# Patient Record
Sex: FEMALE | Race: WHITE | HISPANIC OR LATINO | Employment: OTHER
[De-identification: names, ages, dates, MRNs, and addresses within clinical notes are randomized per-mention and may not be internally consistent; named-entity substitution may affect disease eponyms.]

---

## 2017-01-02 ENCOUNTER — HOSPITAL (OUTPATIENT)
Dept: OTHER | Age: 67
End: 2017-01-02
Attending: OBSTETRICS & GYNECOLOGY

## 2017-02-20 ENCOUNTER — HOSPITAL (OUTPATIENT)
Dept: OTHER | Age: 67
End: 2017-02-20
Attending: OBSTETRICS & GYNECOLOGY

## 2017-08-01 ENCOUNTER — PRIOR ORIGINAL RECORDS (OUTPATIENT)
Dept: OTHER | Age: 67
End: 2017-08-01

## 2017-08-01 ENCOUNTER — HOSPITAL (OUTPATIENT)
Dept: OTHER | Age: 67
End: 2017-08-01
Attending: OBSTETRICS & GYNECOLOGY

## 2017-08-01 LAB — CANCER AG125 SERPL-ACNC: 20 UNIT/ML (ref 0–35)

## 2017-08-25 ENCOUNTER — PRIOR ORIGINAL RECORDS (OUTPATIENT)
Dept: OTHER | Age: 67
End: 2017-08-25

## 2017-08-25 ENCOUNTER — HOSPITAL (OUTPATIENT)
Dept: OTHER | Age: 67
End: 2017-08-25
Attending: OBSTETRICS & GYNECOLOGY

## 2017-09-01 ENCOUNTER — HOSPITAL (OUTPATIENT)
Dept: OTHER | Age: 67
End: 2017-09-01
Attending: OBSTETRICS & GYNECOLOGY

## 2017-09-18 ENCOUNTER — HOSPITAL (OUTPATIENT)
Dept: OTHER | Age: 67
End: 2017-09-18
Attending: OBSTETRICS & GYNECOLOGY

## 2017-09-19 ENCOUNTER — LAB SERVICES (OUTPATIENT)
Dept: OTHER | Age: 67
End: 2017-09-19

## 2017-09-19 LAB
ANALYZER ANC (IANC): ABNORMAL
BASOPHILS # BLD: 0 THOUSAND/MCL (ref 0–0.3)
BASOPHILS NFR BLD: 0 %
DIFFERENTIAL METHOD BLD: ABNORMAL
EOSINOPHIL # BLD: 0 THOUSAND/MCL (ref 0.1–0.5)
EOSINOPHIL NFR BLD: 0 %
ERYTHROCYTE [DISTWIDTH] IN BLOOD: 13.2 % (ref 11–15)
HEMATOCRIT: 31.7 % (ref 36–46.5)
HGB BLD-MCNC: 10.6 GM/DL (ref 12–15.5)
LYMPHOCYTES # BLD: 1.3 THOUSAND/MCL (ref 1–4)
LYMPHOCYTES NFR BLD: 8 %
MCH RBC QN AUTO: 29.6 PG (ref 26–34)
MCHC RBC AUTO-ENTMCNC: 33.4 GM/DL (ref 32–36.5)
MCV RBC AUTO: 88.5 FL (ref 78–100)
MONOCYTES # BLD: 0.9 THOUSAND/MCL (ref 0.3–0.9)
MONOCYTES NFR BLD: 6 %
NEUTROPHILS # BLD: 13.1 THOUSAND/MCL (ref 1.8–7.7)
NEUTROPHILS NFR BLD: 86 %
NEUTS SEG NFR BLD: ABNORMAL %
PERCENT NRBC: ABNORMAL
PLATELET # BLD: 167 THOUSAND/MCL (ref 140–450)
RBC # BLD: 3.58 MILLION/MCL (ref 4–5.2)
WBC # BLD: 15.2 THOUSAND/MCL (ref 4.2–11)

## 2017-09-20 LAB
ANALYZER ANC (IANC): ABNORMAL
BASOPHILS # BLD: 0 K/MCL (ref 0–0.3)
BASOPHILS NFR BLD: 0 %
DIFFERENTIAL METHOD BLD: ABNORMAL
EOSINOPHIL # BLD: 0 K/MCL (ref 0.1–0.5)
EOSINOPHIL NFR BLD: 0 %
ERYTHROCYTE [DISTWIDTH] IN BLOOD: 13.2 % (ref 11–15)
HEMATOCRIT: 31.7 % (ref 36–46.5)
HEMOGLOBIN: 10.6 G/DL (ref 12–15.5)
LYMPHOCYTES # BLD: 1.3 K/MCL (ref 1–4)
LYMPHOCYTES NFR BLD: 8 %
MEAN CORPUSCULAR HEMOGLOBIN: 29.6 PG (ref 26–34)
MEAN CORPUSCULAR HGB CONC: 33.4 G/DL (ref 32–36.5)
MEAN CORPUSCULAR VOLUME: 88.5 FL (ref 78–100)
MONOCYTES # BLD: 0.9 K/MCL (ref 0.3–0.9)
MONOCYTES NFR BLD: 6 %
NEUTROPHILS # BLD: 13.1 K/MCL (ref 1.8–7.7)
NEUTROPHILS NFR BLD: 86 %
NEUTS SEG NFR BLD: ABNORMAL
NRBC (NRBCRE): ABNORMAL
PLATELET COUNT: 167 K/MCL (ref 140–450)
RED CELL COUNT: 3.58 MIL/MCL (ref 4–5.2)
WHITE BLOOD COUNT: 15.2 K/MCL (ref 4.2–11)

## 2017-09-29 ENCOUNTER — PRIOR ORIGINAL RECORDS (OUTPATIENT)
Dept: OTHER | Age: 67
End: 2017-09-29

## 2017-10-01 ENCOUNTER — HOSPITAL (OUTPATIENT)
Dept: OTHER | Age: 67
End: 2017-10-01
Attending: OBSTETRICS & GYNECOLOGY

## 2017-10-04 ENCOUNTER — PRIOR ORIGINAL RECORDS (OUTPATIENT)
Dept: OTHER | Age: 67
End: 2017-10-04

## 2020-02-09 VITALS
HEART RATE: 80 BPM | RESPIRATION RATE: 18 BRPM | WEIGHT: 236.77 LBS | SYSTOLIC BLOOD PRESSURE: 125 MMHG | DIASTOLIC BLOOD PRESSURE: 74 MMHG

## 2020-02-09 VITALS
WEIGHT: 239.86 LBS | HEART RATE: 66 BPM | DIASTOLIC BLOOD PRESSURE: 81 MMHG | SYSTOLIC BLOOD PRESSURE: 136 MMHG | RESPIRATION RATE: 20 BRPM

## 2020-02-09 VITALS
DIASTOLIC BLOOD PRESSURE: 79 MMHG | RESPIRATION RATE: 12 BRPM | SYSTOLIC BLOOD PRESSURE: 141 MMHG | HEART RATE: 71 BPM | WEIGHT: 236.33 LBS

## 2020-02-09 VITALS
WEIGHT: 237.1 LBS | SYSTOLIC BLOOD PRESSURE: 161 MMHG | RESPIRATION RATE: 16 BRPM | DIASTOLIC BLOOD PRESSURE: 82 MMHG | HEART RATE: 87 BPM

## 2020-02-29 ENCOUNTER — APPOINTMENT (OUTPATIENT)
Dept: GENERAL RADIOLOGY | Facility: HOSPITAL | Age: 70
End: 2020-02-29
Attending: EMERGENCY MEDICINE
Payer: MEDICARE

## 2020-02-29 ENCOUNTER — HOSPITAL ENCOUNTER (EMERGENCY)
Facility: HOSPITAL | Age: 70
Discharge: HOME | End: 2020-02-29
Attending: EMERGENCY MEDICINE
Payer: MEDICARE

## 2020-02-29 ENCOUNTER — APPOINTMENT (OUTPATIENT)
Dept: CT IMAGING | Facility: HOSPITAL | Age: 70
End: 2020-02-29
Attending: EMERGENCY MEDICINE
Payer: MEDICARE

## 2020-02-29 VITALS
TEMPERATURE: 99 F | WEIGHT: 240 LBS | HEART RATE: 70 BPM | OXYGEN SATURATION: 98 % | BODY MASS INDEX: 42.52 KG/M2 | SYSTOLIC BLOOD PRESSURE: 100 MMHG | DIASTOLIC BLOOD PRESSURE: 67 MMHG | HEIGHT: 63 IN | RESPIRATION RATE: 15 BRPM

## 2020-02-29 DIAGNOSIS — R07.89 CHEST PAIN, ATYPICAL: Primary | ICD-10-CM

## 2020-02-29 LAB
ALBUMIN SERPL-MCNC: 3.6 G/DL (ref 3.4–5)
ALBUMIN/GLOB SERPL: 0.8 {RATIO} (ref 1–2)
ALP LIVER SERPL-CCNC: 83 U/L (ref 55–142)
ALT SERPL-CCNC: 30 U/L (ref 13–56)
ANION GAP SERPL CALC-SCNC: 3 MMOL/L (ref 0–18)
BASOPHILS # BLD AUTO: 0.03 X10(3) UL (ref 0–0.2)
BASOPHILS NFR BLD AUTO: 0.5 %
BILIRUB SERPL-MCNC: 0.5 MG/DL (ref 0.1–2)
BUN BLD-MCNC: 16 MG/DL (ref 7–18)
BUN/CREAT SERPL: 15.7 (ref 10–20)
CALCIUM BLD-MCNC: 9.1 MG/DL (ref 8.5–10.1)
CHLORIDE SERPL-SCNC: 106 MMOL/L (ref 98–112)
CO2 SERPL-SCNC: 27 MMOL/L (ref 21–32)
CREAT BLD-MCNC: 1.02 MG/DL (ref 0.55–1.02)
D-DIMER: 1.14 UG/ML FEU (ref ?–0.7)
DEPRECATED RDW RBC AUTO: 43.8 FL (ref 35.1–46.3)
EOSINOPHIL # BLD AUTO: 0.2 X10(3) UL (ref 0–0.7)
EOSINOPHIL NFR BLD AUTO: 3.1 %
ERYTHROCYTE [DISTWIDTH] IN BLOOD BY AUTOMATED COUNT: 13.2 % (ref 11–15)
GLOBULIN PLAS-MCNC: 4.6 G/DL (ref 2.8–4.4)
GLUCOSE BLD-MCNC: 83 MG/DL (ref 70–99)
HCT VFR BLD AUTO: 38.9 % (ref 35–48)
HGB BLD-MCNC: 12.4 G/DL (ref 12–16)
IMM GRANULOCYTES # BLD AUTO: 0.03 X10(3) UL (ref 0–1)
IMM GRANULOCYTES NFR BLD: 0.5 %
LYMPHOCYTES # BLD AUTO: 2.21 X10(3) UL (ref 1–4)
LYMPHOCYTES NFR BLD AUTO: 34.4 %
M PROTEIN MFR SERPL ELPH: 8.2 G/DL (ref 6.4–8.2)
MCH RBC QN AUTO: 29 PG (ref 26–34)
MCHC RBC AUTO-ENTMCNC: 31.9 G/DL (ref 31–37)
MCV RBC AUTO: 90.9 FL (ref 80–100)
MONOCYTES # BLD AUTO: 1.1 X10(3) UL (ref 0.1–1)
MONOCYTES NFR BLD AUTO: 17.1 %
NEUTROPHILS # BLD AUTO: 2.86 X10 (3) UL (ref 1.5–7.7)
NEUTROPHILS # BLD AUTO: 2.86 X10(3) UL (ref 1.5–7.7)
NEUTROPHILS NFR BLD AUTO: 44.4 %
OSMOLALITY SERPL CALC.SUM OF ELEC: 282 MOSM/KG (ref 275–295)
PLATELET # BLD AUTO: 196 10(3)UL (ref 150–450)
RBC # BLD AUTO: 4.28 X10(6)UL (ref 3.8–5.3)
SODIUM SERPL-SCNC: 136 MMOL/L (ref 136–145)
TROPONIN I SERPL-MCNC: <0.045 NG/ML (ref ?–0.04)
WBC # BLD AUTO: 6.4 X10(3) UL (ref 4–11)

## 2020-02-29 PROCEDURE — 85025 COMPLETE CBC W/AUTO DIFF WBC: CPT | Performed by: EMERGENCY MEDICINE

## 2020-02-29 PROCEDURE — 80053 COMPREHEN METABOLIC PANEL: CPT | Performed by: EMERGENCY MEDICINE

## 2020-02-29 PROCEDURE — 93010 ELECTROCARDIOGRAM REPORT: CPT

## 2020-02-29 PROCEDURE — 99285 EMERGENCY DEPT VISIT HI MDM: CPT

## 2020-02-29 PROCEDURE — 93005 ELECTROCARDIOGRAM TRACING: CPT

## 2020-02-29 PROCEDURE — 71275 CT ANGIOGRAPHY CHEST: CPT | Performed by: EMERGENCY MEDICINE

## 2020-02-29 PROCEDURE — 36415 COLL VENOUS BLD VENIPUNCTURE: CPT

## 2020-02-29 PROCEDURE — 85379 FIBRIN DEGRADATION QUANT: CPT | Performed by: EMERGENCY MEDICINE

## 2020-02-29 PROCEDURE — 71045 X-RAY EXAM CHEST 1 VIEW: CPT | Performed by: EMERGENCY MEDICINE

## 2020-02-29 PROCEDURE — 84484 ASSAY OF TROPONIN QUANT: CPT | Performed by: EMERGENCY MEDICINE

## 2020-02-29 RX ORDER — ASPIRIN 325 MG
325 TABLET ORAL ONCE
Status: COMPLETED | OUTPATIENT
Start: 2020-02-29 | End: 2020-02-29

## 2020-02-29 RX ORDER — DEXLANSOPRAZOLE 60 MG/1
60 CAPSULE, DELAYED RELEASE ORAL DAILY
COMMUNITY

## 2020-02-29 RX ORDER — HYDROCODONE BITARTRATE AND ACETAMINOPHEN 10; 325 MG/1; MG/1
1 TABLET ORAL EVERY 6 HOURS PRN
COMMUNITY

## 2020-02-29 RX ORDER — AMLODIPINE BESYLATE 10 MG/1
20 TABLET ORAL DAILY
COMMUNITY

## 2020-02-29 RX ORDER — TIZANIDINE HYDROCHLORIDE 2 MG/1
2 CAPSULE, GELATIN COATED ORAL 3 TIMES DAILY
COMMUNITY

## 2020-02-29 RX ORDER — METOCLOPRAMIDE 10 MG/1
10 TABLET ORAL
COMMUNITY

## 2020-03-01 LAB
ATRIAL RATE: 82 BPM
P AXIS: 62 DEGREES
P-R INTERVAL: 156 MS
Q-T INTERVAL: 370 MS
QRS DURATION: 92 MS
QTC CALCULATION (BEZET): 432 MS
R AXIS: -16 DEGREES
T AXIS: -6 DEGREES
VENTRICULAR RATE: 82 BPM

## 2020-03-01 NOTE — ED INITIAL ASSESSMENT (HPI)
Pt here with intermittent midsternal chest pain with radiation to the right. +intermittent sob feels heart racing at times. BUE numbness.  Abnormal ekg yesterday

## 2020-03-01 NOTE — ED NOTES
Chemistry tech called for hemolyzed specimen for S K & AST, dr Ivan Hyman made aware, no redraw needed @ this time

## 2020-03-01 NOTE — ED PROVIDER NOTES
Patient Seen in: [facility] Emergency Department      History   Patient presents with:  Chest Pain Angina    Stated Complaint: chest pain    HPI    Patient is a 54-year-old woman with multiple medical problems. Here with her daughter.   Nick cephalic atraumatic. Nonicteric sclera. Moist mucous membranes. No meningismus. No adenopathy  Lungs: No tachypnea. Lungs clear to auscultation bilaterally without rales/rhonchi. Equal breath sounds bilaterally.   Reproducible chest wall tenderness  C Otherwise negative. Report reviewed, x-ray reviewed      MDM     Atypical chest pain intermittent for several months. Not clearly anginal.  Does have risk given her age. EKG is nonspecific. Troponin is negative. D-dimer however is elevated.   Will pro

## 2020-03-02 NOTE — CM/SW NOTE
Spoke with daughter. She will schedule hannah secondary to her getting her mom's availability. She goes between her home and her residence in [place]. Daughter is transferring all her medical care out to the [address] [place] [address].  Per centralized scheduling, leah malone

## 2020-03-04 ENCOUNTER — HOSPITAL ENCOUNTER (OUTPATIENT)
Dept: CV DIAGNOSTICS | Facility: HOSPITAL | Age: 70
Discharge: HOME | End: 2020-03-04
Attending: EMERGENCY MEDICINE
Payer: MEDICARE

## 2020-03-04 DIAGNOSIS — R07.89 CHEST PAIN, ATYPICAL: ICD-10-CM

## 2020-03-04 NOTE — PROGRESS NOTES
The rest only portion of the Nuclear Stress Test was done today. Patient is here without chest pain for Lexiscan Nuc. The Stress portion was not done due to bilat wheezing and coughing for 4 days.  The wheezing was not cleared up with the inhaler so after a

## 2020-09-10 PROBLEM — M48.061 SPINAL STENOSIS, LUMBAR REGION, WITHOUT NEUROGENIC CLAUDICATION: Status: ACTIVE | Noted: 2020-09-10

## 2020-09-10 PROBLEM — M54.16 LUMBAR RADICULOPATHY: Status: ACTIVE | Noted: 2020-09-10

## 2020-09-10 PROBLEM — M51.26 OTHER INTERVERTEBRAL DISC DISPLACEMENT, LUMBAR REGION: Status: ACTIVE | Noted: 2020-09-10

## 2020-09-10 RX ORDER — NAPROXEN 250 MG/1
250 TABLET ORAL 2 TIMES DAILY WITH MEALS
COMMUNITY

## 2021-02-18 DIAGNOSIS — Z01.812 PRE-PROCEDURAL LABORATORY EXAMINATION: Primary | ICD-10-CM

## 2021-02-27 ENCOUNTER — LAB SERVICES (OUTPATIENT)
Dept: LAB | Age: 71
End: 2021-02-27

## 2021-02-27 DIAGNOSIS — Z01.812 PRE-PROCEDURAL LABORATORY EXAMINATION: ICD-10-CM

## 2021-02-27 LAB
SARS-COV-2 RNA RESP QL NAA+PROBE: NOT DETECTED
SERVICE CMNT-IMP: NORMAL
SERVICE CMNT-IMP: NORMAL

## 2021-02-27 PROCEDURE — U0003 INFECTIOUS AGENT DETECTION BY NUCLEIC ACID (DNA OR RNA); SEVERE ACUTE RESPIRATORY SYNDROME CORONAVIRUS 2 (SARS-COV-2) (CORONAVIRUS DISEASE [COVID-19]), AMPLIFIED PROBE TECHNIQUE, MAKING USE OF HIGH THROUGHPUT TECHNOLOGIES AS DESCRIBED BY CMS-2020-01-R: HCPCS | Performed by: CLINICAL MEDICAL LABORATORY

## 2021-02-27 PROCEDURE — U0005 INFEC AGEN DETEC AMPLI PROBE: HCPCS | Performed by: CLINICAL MEDICAL LABORATORY

## 2021-03-02 ENCOUNTER — HOSPITAL ENCOUNTER (OUTPATIENT)
Age: 71
Discharge: HOME | End: 2021-03-02
Attending: OPHTHALMOLOGY | Admitting: OPHTHALMOLOGY

## 2021-03-02 ENCOUNTER — ANESTHESIA (OUTPATIENT)
Dept: SURGERY | Age: 71
End: 2021-03-02

## 2021-03-02 ENCOUNTER — ANESTHESIA EVENT (OUTPATIENT)
Dept: SURGERY | Age: 71
End: 2021-03-02

## 2021-03-02 VITALS
DIASTOLIC BLOOD PRESSURE: 69 MMHG | SYSTOLIC BLOOD PRESSURE: 145 MMHG | HEART RATE: 74 BPM | RESPIRATION RATE: 14 BRPM | WEIGHT: 245 LBS | BODY MASS INDEX: 43.41 KG/M2 | TEMPERATURE: 98.8 F | OXYGEN SATURATION: 99 % | HEIGHT: 63 IN

## 2021-03-02 PROCEDURE — 13000001 HB PHASE II RECOVERY EA 30 MINUTES: Performed by: OPHTHALMOLOGY

## 2021-03-02 PROCEDURE — 13003287

## 2021-03-02 PROCEDURE — 10004651 HB RX, NO CHARGE ITEM: Performed by: OPHTHALMOLOGY

## 2021-03-02 PROCEDURE — 10002800 HB RX 250 W HCPCS: Performed by: NURSE ANESTHETIST, CERTIFIED REGISTERED

## 2021-03-02 PROCEDURE — V2632 POST CHMBR INTRAOCULAR LENS: HCPCS | Performed by: OPHTHALMOLOGY

## 2021-03-02 PROCEDURE — 13000007 HB ANESTHESIA MAC EA ADD MINUTE: Performed by: OPHTHALMOLOGY

## 2021-03-02 PROCEDURE — 10002803 HB RX 637

## 2021-03-02 PROCEDURE — 13000006 HB ANESTHESIA MAC S/U + 1ST 15 MIN: Performed by: OPHTHALMOLOGY

## 2021-03-02 PROCEDURE — 10002807 HB RX 258: Performed by: NURSE ANESTHETIST, CERTIFIED REGISTERED

## 2021-03-02 PROCEDURE — 10002803 HB RX 637: Performed by: OPHTHALMOLOGY

## 2021-03-02 PROCEDURE — 10002800 HB RX 250 W HCPCS: Performed by: OPHTHALMOLOGY

## 2021-03-02 PROCEDURE — 10002801 HB RX 250 W/O HCPCS: Performed by: OPHTHALMOLOGY

## 2021-03-02 PROCEDURE — 13000036 HB COMPLEX  CASE S/U + 1ST 15 MIN: Performed by: OPHTHALMOLOGY

## 2021-03-02 PROCEDURE — 10002801 HB RX 250 W/O HCPCS

## 2021-03-02 PROCEDURE — 13000037 HB COMPLEX CASE EACH ADD MINUTE: Performed by: OPHTHALMOLOGY

## 2021-03-02 PROCEDURE — 10006026 HB SUPPLY 276: Performed by: OPHTHALMOLOGY

## 2021-03-02 DEVICE — STERILE UV AND BLUE LIGHT FILTERING ACRYLIC FOLDABLE SINGLE-PIECE POSTERIOR CHAMBER LENSES WITH THE ULTRASERT® PRE-LOADED DELIVERY SYSTEM
Type: IMPLANTABLE DEVICE | Site: EYE | Status: FUNCTIONAL
Brand: ACRYSOF® ULTRASERT®

## 2021-03-02 RX ORDER — METOCLOPRAMIDE 10 MG/1
10 TABLET ORAL
COMMUNITY

## 2021-03-02 RX ORDER — KETOROLAC TROMETHAMINE 5 MG/ML
SOLUTION OPHTHALMIC
COMMUNITY
Start: 2021-01-27

## 2021-03-02 RX ORDER — 0.9 % SODIUM CHLORIDE 0.9 %
2 VIAL (ML) INJECTION EVERY 12 HOURS SCHEDULED
Status: DISCONTINUED | OUTPATIENT
Start: 2021-03-02 | End: 2021-03-02 | Stop reason: HOSPADM

## 2021-03-02 RX ORDER — AMLODIPINE BESYLATE AND BENAZEPRIL HYDROCHLORIDE 10; 20 MG/1; MG/1
1 CAPSULE ORAL DAILY
COMMUNITY
Start: 2021-01-14

## 2021-03-02 RX ORDER — BALANCED SALT SOLUTION 6.4; .75; .48; .3; 3.9; 1.7 MG/ML; MG/ML; MG/ML; MG/ML; MG/ML; MG/ML
SOLUTION OPHTHALMIC PRN
Status: DISCONTINUED | OUTPATIENT
Start: 2021-03-02 | End: 2021-03-02 | Stop reason: HOSPADM

## 2021-03-02 RX ORDER — TROPICAMIDE 10 MG/ML
1 SOLUTION/ DROPS OPHTHALMIC
Status: COMPLETED | OUTPATIENT
Start: 2021-03-02 | End: 2021-03-02

## 2021-03-02 RX ORDER — MIDAZOLAM HYDROCHLORIDE 1 MG/ML
INJECTION, SOLUTION INTRAMUSCULAR; INTRAVENOUS PRN
Status: DISCONTINUED | OUTPATIENT
Start: 2021-03-02 | End: 2021-03-02

## 2021-03-02 RX ORDER — ACETAZOLAMIDE 500 MG/1
500 CAPSULE, EXTENDED RELEASE ORAL ONCE
Status: COMPLETED | OUTPATIENT
Start: 2021-03-02 | End: 2021-03-02

## 2021-03-02 RX ORDER — SODIUM CHLORIDE, SODIUM LACTATE, POTASSIUM CHLORIDE, CALCIUM CHLORIDE 600; 310; 30; 20 MG/100ML; MG/100ML; MG/100ML; MG/100ML
INJECTION, SOLUTION INTRAVENOUS CONTINUOUS PRN
Status: DISCONTINUED | OUTPATIENT
Start: 2021-03-02 | End: 2021-03-02

## 2021-03-02 RX ORDER — CYCLOPENTOLATE HYDROCHLORIDE 20 MG/ML
1 SOLUTION/ DROPS OPHTHALMIC
Status: ACTIVE | OUTPATIENT
Start: 2021-03-02 | End: 2021-03-02

## 2021-03-02 RX ORDER — PROPARACAINE HYDROCHLORIDE 5 MG/ML
SOLUTION/ DROPS OPHTHALMIC PRN
Status: DISCONTINUED | OUTPATIENT
Start: 2021-03-02 | End: 2021-03-02 | Stop reason: HOSPADM

## 2021-03-02 RX ORDER — ALBUTEROL SULFATE 90 UG/1
2 AEROSOL, METERED RESPIRATORY (INHALATION)
COMMUNITY

## 2021-03-02 RX ORDER — DEXLANSOPRAZOLE 60 MG/1
60 CAPSULE, DELAYED RELEASE ORAL DAILY
COMMUNITY
Start: 2021-02-09 | End: 2021-03-11

## 2021-03-02 RX ORDER — ACETAMINOPHEN 325 MG/1
650 TABLET ORAL EVERY 4 HOURS PRN
Status: DISCONTINUED | OUTPATIENT
Start: 2021-03-02 | End: 2021-03-02 | Stop reason: HOSPADM

## 2021-03-02 RX ORDER — BUDESONIDE AND FORMOTEROL FUMARATE DIHYDRATE 160; 4.5 UG/1; UG/1
AEROSOL RESPIRATORY (INHALATION)
COMMUNITY
Start: 2016-11-08

## 2021-03-02 RX ORDER — METOCLOPRAMIDE 10 MG/1
TABLET ORAL
COMMUNITY
Start: 2021-01-14

## 2021-03-02 RX ORDER — ALBUTEROL SULFATE 2.5 MG/3ML
SOLUTION RESPIRATORY (INHALATION)
COMMUNITY

## 2021-03-02 RX ORDER — MOXIFLOXACIN 5 MG/ML
SOLUTION/ DROPS OPHTHALMIC PRN
Status: DISCONTINUED | OUTPATIENT
Start: 2021-03-02 | End: 2021-03-02 | Stop reason: HOSPADM

## 2021-03-02 RX ORDER — PHENYLEPHRINE HCL - LIDOCAINE HCL 10; 15 MG/ML; MG/ML
INJECTION, SOLUTION INTRAOCULAR; OPHTHALMIC PRN
Status: DISCONTINUED | OUTPATIENT
Start: 2021-03-02 | End: 2021-03-02 | Stop reason: HOSPADM

## 2021-03-02 RX ORDER — ATORVASTATIN CALCIUM 20 MG/1
20 TABLET, FILM COATED ORAL DAILY
COMMUNITY

## 2021-03-02 RX ORDER — PHENYLEPHRINE HYDROCHLORIDE 25 MG/ML
1 SOLUTION/ DROPS OPHTHALMIC
Status: ACTIVE | OUTPATIENT
Start: 2021-03-02 | End: 2021-03-02

## 2021-03-02 RX ORDER — MOXIFLOXACIN 5 MG/ML
SOLUTION/ DROPS OPHTHALMIC
COMMUNITY
Start: 2021-01-27

## 2021-03-02 RX ORDER — HYDROCODONE BITARTRATE AND ACETAMINOPHEN 10; 325 MG/1; MG/1
1 TABLET ORAL 3 TIMES DAILY
COMMUNITY
Start: 2021-02-14

## 2021-03-02 RX ADMIN — MIDAZOLAM HYDROCHLORIDE 1 MG: 1 INJECTION, SOLUTION INTRAMUSCULAR; INTRAVENOUS at 13:32

## 2021-03-02 RX ADMIN — SODIUM CHLORIDE, POTASSIUM CHLORIDE, SODIUM LACTATE AND CALCIUM CHLORIDE: 600; 310; 30; 20 INJECTION, SOLUTION INTRAVENOUS at 13:30

## 2021-03-02 RX ADMIN — FENTANYL CITRATE 50 MCG: 50 INJECTION, SOLUTION INTRAMUSCULAR; INTRAVENOUS at 13:34

## 2021-03-02 RX ADMIN — TROPICAMIDE 1 DROP: 10 SOLUTION/ DROPS OPHTHALMIC at 12:20

## 2021-03-02 RX ADMIN — FENTANYL CITRATE 50 MCG: 50 INJECTION, SOLUTION INTRAMUSCULAR; INTRAVENOUS at 13:42

## 2021-03-02 RX ADMIN — MIDAZOLAM HYDROCHLORIDE 1 MG: 1 INJECTION, SOLUTION INTRAMUSCULAR; INTRAVENOUS at 13:41

## 2021-03-02 RX ADMIN — ACETAMINOPHEN 650 MG: 325 TABLET ORAL at 14:26

## 2021-03-02 RX ADMIN — ACETAZOLAMIDE 500 MG: 500 CAPSULE, EXTENDED RELEASE ORAL at 14:13

## 2021-03-02 RX ADMIN — TROPICAMIDE 1 DROP: 10 SOLUTION/ DROPS OPHTHALMIC at 12:25

## 2021-03-02 RX ADMIN — TROPICAMIDE 1 DROP: 10 SOLUTION/ DROPS OPHTHALMIC at 12:15

## 2021-03-02 SDOH — SOCIAL STABILITY: SOCIAL INSECURITY: RISK FACTORS: AGE

## 2021-03-02 SDOH — SOCIAL STABILITY: SOCIAL INSECURITY: RISK FACTORS: BMI> 30 (OBESITY)

## 2021-03-02 ASSESSMENT — PAIN SCALES - GENERAL
PAINLEVEL_OUTOF10: 0
PAINLEVEL_OUTOF10: 0
PAINLEVEL_OUTOF10: 4
PAINLEVEL_OUTOF10: 0

## 2021-03-02 ASSESSMENT — ENCOUNTER SYMPTOMS: EXERCISE TOLERANCE: GOOD (>4 METS)

## 2022-10-20 ENCOUNTER — TELEPHONE (OUTPATIENT)
Dept: OTHER | Age: 72
End: 2022-10-20

## 2023-04-10 ENCOUNTER — OFFICE VISIT (OUTPATIENT)
Dept: FAMILY MEDICINE CLINIC | Facility: CLINIC | Age: 73
End: 2023-04-10
Payer: COMMERCIAL

## 2023-04-10 VITALS
HEIGHT: 63 IN | BODY MASS INDEX: 49.61 KG/M2 | HEART RATE: 83 BPM | RESPIRATION RATE: 16 BRPM | SYSTOLIC BLOOD PRESSURE: 120 MMHG | OXYGEN SATURATION: 98 % | WEIGHT: 280 LBS | TEMPERATURE: 98 F | DIASTOLIC BLOOD PRESSURE: 70 MMHG

## 2023-04-10 DIAGNOSIS — M25.512 ACUTE PAIN OF BOTH SHOULDERS: ICD-10-CM

## 2023-04-10 DIAGNOSIS — M25.511 ACUTE PAIN OF BOTH SHOULDERS: ICD-10-CM

## 2023-04-10 DIAGNOSIS — M54.2 NECK PAIN: ICD-10-CM

## 2023-04-10 DIAGNOSIS — Z00.00 ENCOUNTER FOR MEDICAL EXAMINATION TO ESTABLISH CARE: Primary | ICD-10-CM

## 2023-04-10 RX ORDER — PREDNISONE 10 MG/1
1 TABLET ORAL AS DIRECTED
COMMUNITY

## 2023-04-10 RX ORDER — TRAZODONE HYDROCHLORIDE 100 MG/1
TABLET ORAL
COMMUNITY
Start: 2023-04-04

## 2023-04-10 RX ORDER — BUDESONIDE AND FORMOTEROL FUMARATE DIHYDRATE 160; 4.5 UG/1; UG/1
AEROSOL RESPIRATORY (INHALATION)
COMMUNITY
Start: 2023-04-01

## 2023-04-10 RX ORDER — BACLOFEN 10 MG/1
10 TABLET ORAL 3 TIMES DAILY
Qty: 30 TABLET | Refills: 0 | Status: SHIPPED | OUTPATIENT
Start: 2023-04-10 | End: 2023-04-20

## 2023-04-10 RX ORDER — ALBUTEROL SULFATE 90 UG/1
2 AEROSOL, METERED RESPIRATORY (INHALATION)
COMMUNITY
Start: 2022-11-03

## 2023-04-10 RX ORDER — ATORVASTATIN CALCIUM 20 MG/1
TABLET, FILM COATED ORAL
COMMUNITY
Start: 2023-02-13

## 2023-04-10 RX ORDER — HYDROCHLOROTHIAZIDE 25 MG/1
TABLET ORAL
COMMUNITY
Start: 2023-02-12

## 2023-04-10 RX ORDER — PANTOPRAZOLE SODIUM 40 MG/1
40 TABLET, DELAYED RELEASE ORAL EVERY MORNING
COMMUNITY
Start: 2023-01-10

## 2023-04-12 ENCOUNTER — HOSPITAL ENCOUNTER (OUTPATIENT)
Age: 73
Discharge: HOME | End: 2023-04-12
Payer: MEDICARE

## 2023-04-12 VITALS
RESPIRATION RATE: 16 BRPM | SYSTOLIC BLOOD PRESSURE: 138 MMHG | OXYGEN SATURATION: 99 % | TEMPERATURE: 98 F | HEART RATE: 82 BPM | DIASTOLIC BLOOD PRESSURE: 72 MMHG

## 2023-04-12 DIAGNOSIS — J06.9 VIRAL URI WITH COUGH: Primary | ICD-10-CM

## 2023-04-12 LAB
S PYO AG THROAT QL: NEGATIVE
SARS-COV-2 RNA RESP QL NAA+PROBE: NOT DETECTED

## 2023-04-12 PROCEDURE — 87880 STREP A ASSAY W/OPTIC: CPT | Performed by: NURSE PRACTITIONER

## 2023-04-12 PROCEDURE — 99213 OFFICE O/P EST LOW 20 MIN: CPT | Performed by: NURSE PRACTITIONER

## 2023-04-12 PROCEDURE — U0002 COVID-19 LAB TEST NON-CDC: HCPCS | Performed by: NURSE PRACTITIONER

## 2023-04-12 NOTE — ED INITIAL ASSESSMENT (HPI)
Pt presents with cough and congestion. \"I hear wheezes when I breath\". Mild SOB. Pt seen by PMD on 4/10/23. Pt reports no testing completed at office. Pt reports negative home Covid Antigen Test yesterday.

## 2024-04-03 ENCOUNTER — LAB ENCOUNTER (OUTPATIENT)
Dept: LAB | Facility: HOSPITAL | Age: 74
End: 2024-04-03
Attending: INTERNAL MEDICINE
Payer: MEDICARE

## 2024-04-03 DIAGNOSIS — R07.89 OTHER CHEST PAIN: Primary | ICD-10-CM

## 2024-04-03 DIAGNOSIS — M54.2 NECK PAIN: ICD-10-CM

## 2024-04-03 DIAGNOSIS — I10 ESSENTIAL HYPERTENSION, MALIGNANT: ICD-10-CM

## 2024-04-03 DIAGNOSIS — Z00.00 ENCOUNTER FOR MEDICAL EXAMINATION TO ESTABLISH CARE: ICD-10-CM

## 2024-04-03 DIAGNOSIS — M25.511 ACUTE PAIN OF BOTH SHOULDERS: ICD-10-CM

## 2024-04-03 DIAGNOSIS — M25.512 ACUTE PAIN OF BOTH SHOULDERS: ICD-10-CM

## 2024-04-03 LAB
ALBUMIN SERPL-MCNC: 4.5 G/DL (ref 3.2–4.8)
ALBUMIN/GLOB SERPL: 1.6 {RATIO} (ref 1–2)
ALP LIVER SERPL-CCNC: 86 U/L
ALT SERPL-CCNC: 14 U/L
ANION GAP SERPL CALC-SCNC: 7 MMOL/L (ref 0–18)
AST SERPL-CCNC: 19 U/L (ref ?–34)
BASOPHILS # BLD AUTO: 0.05 X10(3) UL (ref 0–0.2)
BASOPHILS NFR BLD AUTO: 0.5 %
BILIRUB SERPL-MCNC: 0.6 MG/DL (ref 0.2–1.1)
BUN BLD-MCNC: 17 MG/DL (ref 9–23)
BUN/CREAT SERPL: 15.9 (ref 10–20)
CALCIUM BLD-MCNC: 9.9 MG/DL (ref 8.7–10.4)
CHLORIDE SERPL-SCNC: 106 MMOL/L (ref 98–112)
CHOLEST SERPL-MCNC: 139 MG/DL (ref ?–200)
CO2 SERPL-SCNC: 28 MMOL/L (ref 21–32)
CREAT BLD-MCNC: 1.07 MG/DL
DEPRECATED HBV CORE AB SER IA-ACNC: 184.6 NG/ML
DEPRECATED RDW RBC AUTO: 41.6 FL (ref 35.1–46.3)
EGFRCR SERPLBLD CKD-EPI 2021: 55 ML/MIN/1.73M2 (ref 60–?)
EOSINOPHIL # BLD AUTO: 0.13 X10(3) UL (ref 0–0.7)
EOSINOPHIL NFR BLD AUTO: 1.3 %
ERYTHROCYTE [DISTWIDTH] IN BLOOD BY AUTOMATED COUNT: 12.5 % (ref 11–15)
EST. AVERAGE GLUCOSE BLD GHB EST-MCNC: 114 MG/DL (ref 68–126)
FASTING PATIENT LIPID ANSWER: NO
FASTING STATUS PATIENT QL REPORTED: NO
FOLATE SERPL-MCNC: >24 NG/ML (ref 5.4–?)
GLOBULIN PLAS-MCNC: 2.9 G/DL (ref 2.8–4.4)
GLUCOSE BLD-MCNC: 135 MG/DL (ref 70–99)
HBA1C MFR BLD: 5.6 % (ref ?–5.7)
HCT VFR BLD AUTO: 38.9 %
HDLC SERPL-MCNC: 49 MG/DL (ref 40–59)
HGB BLD-MCNC: 13.1 G/DL
IMM GRANULOCYTES # BLD AUTO: 0.05 X10(3) UL (ref 0–1)
IMM GRANULOCYTES NFR BLD: 0.5 %
IRON SATN MFR SERPL: 15 %
IRON SERPL-MCNC: 46 UG/DL
LDLC SERPL CALC-MCNC: 67 MG/DL (ref ?–100)
LYMPHOCYTES # BLD AUTO: 2 X10(3) UL (ref 1–4)
LYMPHOCYTES NFR BLD AUTO: 20.3 %
MAGNESIUM SERPL-MCNC: 2.1 MG/DL (ref 1.6–2.6)
MCH RBC QN AUTO: 30.3 PG (ref 26–34)
MCHC RBC AUTO-ENTMCNC: 33.7 G/DL (ref 31–37)
MCV RBC AUTO: 90 FL
MONOCYTES # BLD AUTO: 0.56 X10(3) UL (ref 0.1–1)
MONOCYTES NFR BLD AUTO: 5.7 %
NEUTROPHILS # BLD AUTO: 7.04 X10 (3) UL (ref 1.5–7.7)
NEUTROPHILS # BLD AUTO: 7.04 X10(3) UL (ref 1.5–7.7)
NEUTROPHILS NFR BLD AUTO: 71.7 %
NONHDLC SERPL-MCNC: 90 MG/DL (ref ?–130)
OSMOLALITY SERPL CALC.SUM OF ELEC: 296 MOSM/KG (ref 275–295)
PLATELET # BLD AUTO: 259 10(3)UL (ref 150–450)
POTASSIUM SERPL-SCNC: 3.7 MMOL/L (ref 3.5–5.1)
PROT SERPL-MCNC: 7.4 G/DL (ref 5.7–8.2)
RBC # BLD AUTO: 4.32 X10(6)UL
SODIUM SERPL-SCNC: 141 MMOL/L (ref 136–145)
TIBC SERPL-MCNC: 313 UG/DL (ref 250–425)
TRANSFERRIN SERPL-MCNC: 210 MG/DL (ref 250–380)
TRIGL SERPL-MCNC: 134 MG/DL (ref 30–149)
TSI SER-ACNC: 1.09 MIU/ML (ref 0.55–4.78)
VIT B12 SERPL-MCNC: 557 PG/ML (ref 211–911)
VIT D+METAB SERPL-MCNC: 25.6 NG/ML (ref 30–100)
VLDLC SERPL CALC-MCNC: 20 MG/DL (ref 0–30)
WBC # BLD AUTO: 9.8 X10(3) UL (ref 4–11)

## 2024-04-03 PROCEDURE — 83540 ASSAY OF IRON: CPT

## 2024-04-03 PROCEDURE — 84443 ASSAY THYROID STIM HORMONE: CPT

## 2024-04-03 PROCEDURE — 82746 ASSAY OF FOLIC ACID SERUM: CPT

## 2024-04-03 PROCEDURE — 84466 ASSAY OF TRANSFERRIN: CPT

## 2024-04-03 PROCEDURE — 85025 COMPLETE CBC W/AUTO DIFF WBC: CPT

## 2024-04-03 PROCEDURE — 83735 ASSAY OF MAGNESIUM: CPT

## 2024-04-03 PROCEDURE — 82607 VITAMIN B-12: CPT

## 2024-04-03 PROCEDURE — 82306 VITAMIN D 25 HYDROXY: CPT

## 2024-04-03 PROCEDURE — 83036 HEMOGLOBIN GLYCOSYLATED A1C: CPT

## 2024-04-03 PROCEDURE — 80053 COMPREHEN METABOLIC PANEL: CPT

## 2024-04-03 PROCEDURE — 82728 ASSAY OF FERRITIN: CPT

## 2024-04-03 PROCEDURE — 80061 LIPID PANEL: CPT

## 2024-04-03 PROCEDURE — 36415 COLL VENOUS BLD VENIPUNCTURE: CPT

## 2024-04-05 ENCOUNTER — TELEPHONE (OUTPATIENT)
Dept: FAMILY MEDICINE CLINIC | Facility: CLINIC | Age: 74
End: 2024-04-05

## 2024-04-05 NOTE — TELEPHONE ENCOUNTER
----- Message from LASHANDA Mckeon sent at 4/5/2024  7:58 AM CDT -----  Please schedule patient for follow up.

## 2024-05-09 ENCOUNTER — OFFICE VISIT (OUTPATIENT)
Facility: CLINIC | Age: 74
End: 2024-05-09
Payer: MEDICARE

## 2024-05-09 VITALS
HEIGHT: 63 IN | DIASTOLIC BLOOD PRESSURE: 80 MMHG | RESPIRATION RATE: 18 BRPM | SYSTOLIC BLOOD PRESSURE: 132 MMHG | BODY MASS INDEX: 41.29 KG/M2 | OXYGEN SATURATION: 98 % | WEIGHT: 233 LBS | HEART RATE: 78 BPM

## 2024-05-09 DIAGNOSIS — G47.19 DAYTIME HYPERSOMNOLENCE: ICD-10-CM

## 2024-05-09 DIAGNOSIS — E66.01 SEVERE OBESITY (BMI >= 40) (HCC): ICD-10-CM

## 2024-05-09 DIAGNOSIS — K21.9 GASTROESOPHAGEAL REFLUX DISEASE WITHOUT ESOPHAGITIS: ICD-10-CM

## 2024-05-09 DIAGNOSIS — G47.33 OSA (OBSTRUCTIVE SLEEP APNEA): Primary | ICD-10-CM

## 2024-05-09 DIAGNOSIS — G47.00 INSOMNIA, UNSPECIFIED TYPE: ICD-10-CM

## 2024-05-09 PROCEDURE — 99204 OFFICE O/P NEW MOD 45 MIN: CPT | Performed by: INTERNAL MEDICINE

## 2024-05-09 RX ORDER — ZOLPIDEM TARTRATE 5 MG/1
5 TABLET ORAL NIGHTLY PRN
Qty: 1 TABLET | Refills: 0 | Status: SHIPPED | OUTPATIENT
Start: 2024-05-09

## 2024-05-09 NOTE — PATIENT INSTRUCTIONS
Plan:      Schedule CPAP titration sleep study to be interpreted by Dr. Killian Tafoya, will then arrange a NEW CPAP machine   Advised about weight loss   Advised against drowsy driving and to avoid alcoholic beverage and respiratory depressants as these may worsen sleep apnea      Follow up:  3  months       Killian Tafoya MD      Obstructive Sleep Apnea  Obstructive sleep apnea is a condition caused by air passages becoming narrowed or blocked during sleep. As a result, breathing stops for short periods. Your body wakes up enough for breathing to start again. But you don't remember it. The cycle of stopped breathing and brief awakenings can repeat dozens of times a night. This prevents the body from getting to the deeper stages of sleep that are needed for good rest.   Signs of sleep apnea include loud snoring, noisy breathing, and gasping sounds during sleep. People with sleep apnea often find they use the bathroom many times during the night. Daytime symptoms include waking up tired after a full night's sleep and waking up with headaches. They can also include feeling very sleepy or falling asleep during the day, and having problems with memory or concentration.   Risk factors for sleep apnea include:  Being overweight  Being assigned male at birth, or being in menopause  Smoking  Using alcohol or sedating medicines  Having enlarged structures in the nose or throat such as enlarged tonsils or adenoids, or extra tissue in the airway  Home care  Lifestyle changes that can help treat snoring and sleep apnea include:   If you're overweight, talk with your healthcare provider about a weight-loss plan for you.  Don't drink alcohol for 3 to 4 hours before bedtime.  Don't take sedating medicines. Ask your healthcare provider about the medicines you take.  If you smoke, talk to your provider about ways to quit. It's important to stay away from secondhand smoke. Don't use e-cigarettes because of  their harmful side effects.  Sleep on your side. This can help prevent gravity from pulling relaxed throat tissues into your breathing passages.  If you have allergies or sinus problems that block your nose, ask your provider for help.  Use positive airway pressure (PAP). Discuss with your provider the benefits of using PAP at home. And talk about the type of PAP that's best for you.  Follow-up care  Follow up with your healthcare provider, or as advised. A diagnosis of sleep apnea is made with a sleep study. Your provider can tell you more about this test.   When to get medical care  See your healthcare provider if you have daytime symptoms of sleep apnea. These include:   Waking up tired after a full night's sleep  Waking up with a headache  Feeling very sleepy or falling asleep during the day  Having problems with memory or concentration  Also talk with your provider if your partner tells you that you snore, gasp for air, or stop breathing while you sleep.   Seeing your provider is important because sleep apnea can make you more likely to have certain health problems. These include high blood pressure, heart attack, stroke, and sexual dysfunction. If you have sleep apnea, talk with your healthcare provider about the best treatments for you.   [employer] last reviewed this educational content on 5/1/2022  © 2175-9587 [employer]. All rights reserved. This information is not intended as a substitute for professional medical care. Always follow your healthcare professional's instructions.        Continuous Positive Air Pressure (CPAP)     A mask over the nose gently directs air into the throat to keep the airway open.     Continuous positive air pressure (CPAP) uses gentle air pressure to hold the airway open. CPAP is often the most effective treatment for sleep apnea. It works very well as a treatment for adults diagnosed with obstructive sleep apnea with a lot of sleepiness. But keep in mind that it  can take several adjustments before the setup is right for you.   How CPAP works  The CPAP machine  is a small portable pump that sits beside the bed. The pump sends air through a hose, which is held over your nose alone, or nose and mouth by a mask. Mild air pressure is gently pushed through your airway. The air pressure nudges sagging tissues aside. This widens the airway so you can breathe better. CPAP may be combined with other kinds of therapy for sleep apnea.   Types of air pressure treatments  There are different types of CPAP. Your doctor or CPAP technician will help you decide which type is best for you:   Basic CPAP keeps the pressure constant all night long.  A bilevel device (BiPAP) provides more pressure when you breathe in and less when you breathe out. A BiPAP machine also may be set to provide automatic breaths to maintain breathing if you stop breathing while sleeping.  An autoCPAP device automatically adjusts pressure throughout the night and in response to changes such as body position, sleep stage, and snoring.  [employer] last reviewed this educational content on 7/1/2019 © 2000-2023 [employer]. All rights reserved. This information is not intended as a substitute for professional medical care. Always follow your healthcare professional's instructions.

## 2024-05-09 NOTE — PROGRESS NOTES
Pulmonary/Critical Care/Sleep Medicine    Consult Note     PCP: Mya Calhoun DO   Phone: [phone]   Fax: [phone]        Chief Complaint   Patient presents with    New Patient Chief Complaint     Pt here to establish care, pt was on a cpap machine   DME: can't remember  MASK: pillows nasal   Last sleep study: was done in          HPI  I had the pleasure of seeing Carly Contreras who is a pleasant 73 year old female who presents for evaluation of FADI and asthma          The patient states that she has snored at home in past and was noted to have witnessed apnea , she was diagnosed with FADI in 2017, she used CPAP for 7 years but machine malfunctioned, so she presents for sleep evaluation.      She states bed time around 1130 PM . It takes 2-3 hour to fall asleep and leaves bed around 1030-11 AM. She wakes up sometimes 4 to 5 times a night.  She is sleepy and fatigued during the daytime.  She denies nightmares, sleep talking or sleep walking.  She   denies AM headaches.  She denies symptoms sleep attacks, hypnagogic hallucination     The patient does  kick her legs at night. Admits to teeth grinding.      She drinks 2   caffeine cans of soda daily.       She has no pets           Hx of tobacco use: She  reports that she has never smoked. She has never used smokeless tobacco.    Past Medical History:    Arthritis    Back pain    Esophageal reflux    Essential hypertension    FADI (obstructive sleep apnea)      Past Surgical History:   Procedure Laterality Date          Excis lumbar disk,one level      Knee replacement surgery       Allergies   Allergen Reactions    Tetanus Toxoids UNKNOWN    Tetanus Immune Globulin RASH     Current Outpatient Medications   Medication Sig Dispense Refill    traZODone 100 MG Oral Tab       VENTOLIN  (90 Base) MCG/ACT Inhalation Aero Soln Inhale 2 puffs into the lungs every 4 to 6 hours as needed.      SYMBICORT 160-4.5 MCG/ACT Inhalation Aerosol        pantoprazole 40 MG Oral Tab EC Take 1 tablet (40 mg total) by mouth every morning.      predniSONE 10 MG Oral Tab Take 1 tablet (10 mg total) by mouth As Directed.      hydroCHLOROthiazide 25 MG Oral Tab       atorvastatin 20 MG Oral Tab       Dexlansoprazole (DEXILANT) 60 MG Oral Capsule Delayed Release Take 60 mg by mouth daily.      Metoclopramide HCl 10 MG Oral Tab Take 1 tablet (10 mg total) by mouth 3 (three) times daily before meals.      amLODIPine Besylate 10 MG Oral Tab Take 2 tablets (20 mg total) by mouth daily.      HYDROcodone-acetaminophen  MG Oral Tab Take 1 tablet by mouth every 6 (six) hours as needed for Pain.        Social History     Socioeconomic History    Marital status: Single   Occupational History    Occupation: [employer]   Tobacco Use    Smoking status: Never    Smokeless tobacco: Never   Substance and Sexual Activity    Alcohol use: Never    Drug use: Never     Social Determinants of Health      Received from [facility]    Social Connections    Received from [facility]    Housing Stability        There is no immunization history on file for this patient.   Family History   Problem Relation Age of Onset    Hypertension Sister     Heart Disease Brother     Cancer Maternal Grandfather         Review of Systems   Constitutional:  Negative for fatigue, fever and unexpected weight change.   HENT:  Negative for congestion, mouth sores, nosebleeds, postnasal drip, rhinorrhea, sore throat and trouble swallowing.    Eyes:  Negative for visual disturbance.   Respiratory:  Positive for cough and wheezing. Negative for apnea, choking, chest tightness and shortness of breath.    Cardiovascular:  Negative for chest pain, palpitations and leg swelling.   Gastrointestinal:  Negative for abdominal pain, constipation, diarrhea, nausea and vomiting.   Genitourinary:  Negative for difficulty urinating.   Musculoskeletal:  Positive for arthralgias, back pain  and myalgias. Negative for gait problem.   Neurological:  Negative for dizziness, weakness and headaches.   Psychiatric/Behavioral:  Positive for sleep disturbance.        Vitals:    05/09/24 1227   BP: 132/80   Pulse: 78   Resp: 18      SpO2: 98 %  Ht Readings from Last 1 Encounters:   05/09/24 5' 3\" (1.6 m)     Wt Readings from Last 1 Encounters:   05/09/24 233 lb (105.7 kg)     Body mass index is 41.27 kg/m².     Physical Exam  Constitutional:       General: She is not in acute distress.     Appearance: Normal appearance. She is obese. She is not ill-appearing or diaphoretic.   HENT:      Head: Normocephalic and atraumatic.      Nose: Nose normal. No congestion or rhinorrhea.      Comments: Narrow edematous nares bilaterally      Mouth/Throat:      Mouth: Mucous membranes are moist.      Pharynx: Oropharynx is clear. No oropharyngeal exudate or posterior oropharyngeal erythema.      Comments: Mallampati class III palate   Eyes:      Extraocular Movements: Extraocular movements intact.      Pupils: Pupils are equal, round, and reactive to light.   Cardiovascular:      Rate and Rhythm: Normal rate.      Pulses: Normal pulses.      Heart sounds: Normal heart sounds. No murmur heard.  Pulmonary:      Effort: Pulmonary effort is normal. No respiratory distress.      Breath sounds: Normal breath sounds. No wheezing or rhonchi.   Chest:      Chest wall: No tenderness.   Abdominal:      General: Abdomen is flat. Bowel sounds are normal.      Palpations: Abdomen is soft.   Musculoskeletal:         General: Normal range of motion.      Comments: Arthritis changes in both hands    Skin:     General: Skin is warm.      Comments: Vitiligo bilateral hand dorsum    Neurological:      General: No focal deficit present.      Mental Status: She is alert and oriented to person, place, and time.   Psychiatric:         Mood and Affect: Mood normal.         Behavior: Behavior normal.         Thought Content: Thought content normal.          Judgment: Judgment normal.             Labs:  Last BMP  Lab Results   Component Value Date     (H) 04/03/2024    BUN 17 04/03/2024    CREATSERUM 1.07 (H) 04/03/2024    BUNCREA 15.9 04/03/2024    ANIONGAP 7 04/03/2024    GFRAA 64 02/29/2020    GFRNAA 56 (L) 02/29/2020    CA 9.9 04/03/2024     04/03/2024    K 3.7 04/03/2024     04/03/2024    CO2 28.0 04/03/2024    OSMOCALC 296 (H) 04/03/2024      Last CBC  Lab Results   Component Value Date    WBC 9.8 04/03/2024    RBC 4.32 04/03/2024    HGB 13.1 04/03/2024    HCT 38.9 04/03/2024    MCV 90.0 04/03/2024    MCH 30.3 04/03/2024    MCHC 33.7 04/03/2024    RDW 12.5 04/03/2024    .0 04/03/2024      Last CMP  Lab Results   Component Value Date     (H) 04/03/2024    BUN 17 04/03/2024    BUNCREA 15.9 04/03/2024    CREATSERUM 1.07 (H) 04/03/2024    ANIONGAP 7 04/03/2024    GFRNAA 56 (L) 02/29/2020    GFRAA 64 02/29/2020    CA 9.9 04/03/2024    OSMOCALC 296 (H) 04/03/2024    ALKPHO 86 04/03/2024    AST 19 04/03/2024    ALT 14 04/03/2024    BILT 0.6 04/03/2024    TP 7.4 04/03/2024    ALB 4.5 04/03/2024    GLOBULIN 2.9 04/03/2024     04/03/2024    K 3.7 04/03/2024     04/03/2024    CO2 28.0 04/03/2024      Last Thyroid Function  Lab Results   Component Value Date    TSH 1.086 04/03/2024        Imaging:     PROCEDURE:  CT ANGIOGRAPHY, CHEST (CPT=71275)     COMPARISON:  None.     INDICATIONS:  chest pain     TECHNIQUE:  IV contrast-enhanced multislice CT angiography is performed through the pulmonary arterial anatomy. 3D volume renderings are generated.  Dose reduction techniques were used. Dose information is transmitted to the ACR (American College of  Radiology) NRDR (National Radiology Data Registry) which includes the Dose Index Registry. The patient is from the Emergency Department. This examination performed and interpreted on emergency basis, as per STAT status, for emergency room protocol.        PATIENT STATED HISTORY:(As  transcribed by Technologist)  Patient with intermittent chest pain and shortness of breath      CONTRAST USED:  85cc of Omnipaque 350     FINDINGS:       VASCULATURE:  Negative for acute pulmonary embolism.  No filling defects are seen centrally or peripherally within the pulmonary arterial system.     LUNGS/PLEURA:  No acute process.     THORACIC AORTA:  No thoracic aortic aneurysm.     MEDIASTINUM/NOLAN:  No pathologically enlarged nodes.     CARDIAC:  Unremarkable.     CHEST WALL:  Unremarkable.     LIMITED ABDOMEN:  No acute process seen.     BONES:  No acute process seen.     OTHER:  None.         Impression   CONCLUSION:  Negative for acute pulmonary embolism, pneumothorax, pleural effusion, acute pneumonia, or lung mass.        Dictated by: João Aguayo MD on 2/29/2020 at 20:31                  [place] Sleepiness Scale: (ESS) score on today's visit is 19 out of 24.     Score total of 1-6    Normal sleep   Score total of 7-8    Average sleepiness   Score total of 9-24    Abnormal (possibly pathologic) sleepiness       Impression:      Obstructive sleep apnea syndrome (OSAS): Attended sleep study performed on 4/21/2021 showed apnea plus hypopnea index (AHI) 7.4  events per hour.  Treated with CPAP 13 cwp. The CPAP machine is malfunctioning and beyond its life so the patient needs a replacement machine.  Daytime hypersomnolence/fatigue  Obesity: Class III  ;  Body mass index is 41.27 kg/m².  History of asthma: With intermittent symptoms  GERD   Hypertension   Insomnia                               Plan:      Schedule CPAP titration sleep study to be interpreted by Dr. Killian Tafoya, will then arrange a NEW CPAP machine   Advised about weight loss   Referral for weight loss program at [facility] Symbicort 160-4.52 puffs twice daily  Advised against drowsy driving and to avoid alcoholic beverage and respiratory depressants as these may worsen sleep apnea  Ambien 5 mg on the night of sleep study as needed  for insomnia      Follow up:  3  months     Thank you for allowing me to participate in your patient care.    MALU Tafoya MD, FACP, FCCP, FAA - Pulmonary/Critical care/Sleep Medicine  Please contact our office if you have any questions or concerns at [phone]

## 2024-06-24 ENCOUNTER — TELEPHONE (OUTPATIENT)
Dept: SLEEP CENTER | Age: 74
End: 2024-06-24

## 2024-06-27 ENCOUNTER — TELEPHONE (OUTPATIENT)
Facility: CLINIC | Age: 74
End: 2024-06-27

## 2024-06-27 NOTE — TELEPHONE ENCOUNTER
From: Areli Lowe, RPSGT   Sent: 6/24/2024  10:50 AM CDT   To: Killian Tafoya MD; [facility] [place]; Subject: CPAP denied                                      Dr. Tafoya,   The patient was denied her CPAP test here at the [facility].    Please reach out to this patient at [phone] for a new set up on APAP.    Please contact us with any questions at [phone].      Thank You,   Areli     Notes from 06/24/2024  Killian Tafoya MD  [facility] Sleep Staff  Arrange a NEW CPAP machine at 13 cwp and obtain  download data in 30 days    Nurse s/w pt, cpap machine ordered to [facility].  DME will verify insurance and once approved will contact pt to arrange delivery and instructions.  Pt instructed to follow up with Dr. Tafoya once pt starts PAP therapy per insurance compliance requirement.   Pt verbalized understanding of instructions and agrees with the plan.     [phone] ([place])

## 2024-09-09 NOTE — PROGRESS NOTES
[facility]  SLEEP PROGRESS NOTE        HPI:   This is a 73 year old female coming in for   Chief Complaint   Patient presents with    Obstructive Sleep Apnea (FADI)     Dme: doubek  Mask:     31-90 day    HPI:     Received new CPAP machine, used prior for 7 years  CPAP titration denied by insurance  Started on CPAP at [facility]cwp    Doing well with her new machine  Gets some anxiety with her nasal pillows- not affecting her sleep, just feels like she has to take it off right away  Would like to try a nasal cushion   Wakes up with dry mouth, mouth will open in the night  FFM is uncomfortable  Will try chin strap    Dry cough at night- always  Worse when eating, when laying on her back  Compliant with PPI and metroclopramide    Gained 12# since May, believes it is gradual  Feels more swollen and uncomfortable over the last 4-5 days  Had her diuretic switched recently  Denies SOB at rest, NARVAEZ is worsened  Will be calling her cardiologist after her appointment ([facility])    [place] score: 17/24    Sedentary, mainly in recliner  Dozing off in recliner \"all the time\"  In bed with CPAP at 1a  Wakes up 730-8am, takes off CPAP immediately  Out of bed 9-10am  SL 1 hour  Nighttime awakenings 1-2x to use RR  Caffeine 2 caps Pepsi daily  Neck and left shoulder pain can disrupt sleep    Denies teeth grinding, restless legs, headaches, tinnitus    Leg cramps - sometimes  2 cups water per day with 2 cans Pepsi daily    DME company: [employer]  Mask type: Nasal pillows      CLIF DOMÍNGUEZ  2024 - 2024  Patient ID: [id number]  : 1950  Age: 73 years  Gender: Female  [address], [address]  Phone: [phone]  Fax: [phone]  Email: [email or web address]  Compliance Report  Compliance  Payor Standard  Usage 2024 - 2024  Usage days 54/55 days (98%)  >= 4 hours 54 days (98%)  < 4 hours 0 days (0%)  Usage hours 392 hours 0 minutes  Average usage (total days) 7 hours 8 minutes  Average  usage (days used) 7 hours 16 minutes  Median usage (days used) 7 hours 20 minutes  Total used hours (value since last reset - 2024) 392 hours  AirSense 11 AutoSet  Serial number [id number]  Mode CPAP  Set pressure 13 cmH2O  EPR Fulltime  EPR level 3  Therapy  Leaks - L/min Median: 1.8 95th percentile: 20.0 Maximum: 51.3  Events per hour AI: 1.2 HI: 0.3 AHI: 1.5  Apnea Index Central: 0.1 Obstructive: 0.9 Unknown: 0.1  RERA Index 0.3  Cheyne-Caban respiration (average duration per night) 0 minutes (0%)    2021 PSG  AHI 7.4, REM AHI 23.5  No supine sleep sampled  Sleep efficiency 92.9%  PLMS index 0      Patient: Sleep review of systems today: see form.      Pt  PCP:  Mya Calhoun DO  No referring provider defined for this encounter.           No data to display                    Past Medical History:    Arthritis    Asthma (HCC)    Back pain    Esophageal reflux    Essential hypertension    FADI (obstructive sleep apnea)     Past Surgical History:   Procedure Laterality Date          Excis lumbar disk,one level      Knee replacement surgery       Social History:  Social History     Social History Narrative    Not on file     Social History     Socioeconomic History    Marital status: Single   Occupational History    Occupation: [employer]   Tobacco Use    Smoking status: Never    Smokeless tobacco: Never   Substance and Sexual Activity    Alcohol use: Never    Drug use: Never     Social Determinants of Health      [facility]    Social Connections    Received from [facility], [facility]    Housing Stability     Family History:  Family History   Problem Relation Age of Onset    Hypertension Sister     Heart Disease Brother     Cancer Maternal Grandfather      Allergies:  Allergies   Allergen Reactions    Tetanus Immune Globulin RASH    Tetanus Toxoids UNKNOWN, OTHER (SEE COMMENTS) and  RASH     Pt was told she was allergic - ? reaction     Current Meds:  Current Outpatient Medications   Medication Sig Dispense Refill    VENTOLIN  (90 Base) MCG/ACT Inhalation Aero Soln Inhale 2 puffs into the lungs every 4 to 6 hours as needed.      SYMBICORT 160-4.5 MCG/ACT Inhalation Aerosol       pantoprazole 40 MG Oral Tab EC Take 1 tablet (40 mg total) by mouth every morning.      atorvastatin 20 MG Oral Tab       Dexlansoprazole (DEXILANT) 60 MG Oral Capsule Delayed Release Take 60 mg by mouth daily.      Metoclopramide HCl 10 MG Oral Tab Take 1 tablet (10 mg total) by mouth 3 (three) times daily before meals.      amLODIPine Besylate 10 MG Oral Tab Take 2 tablets (20 mg total) by mouth daily.      HYDROcodone-acetaminophen  MG Oral Tab Take 1 tablet by mouth every 6 (six) hours as needed for Pain.      hydroCHLOROthiazide 25 MG Oral Tab  (Patient not taking: Reported on 9/10/2024)        Counseling given: Not Answered         Problem List:  Patient Active Problem List   Diagnosis    Gastroesophageal reflux disease without esophagitis    Daytime hypersomnolence    FADI (obstructive sleep apnea)    Insomnia    Acute on chronic systolic heart failure (HCC)    Stage 3a chronic kidney disease (HCC)       REVIEW OF SYSTEMS:   Review of Systems    EXAM:   /78 (BP Location: Right arm, Patient Position: Sitting, Cuff Size: adult)   Pulse 74   Resp 14   Ht 5' 3\" (1.6 m)   Wt 245 lb (111.1 kg)   SpO2 97%   BMI 43.40 kg/m²  Estimated body mass index is 43.4 kg/m² as calculated from the following:    Height as of this encounter: 5' 3\" (1.6 m).    Weight as of this encounter: 245 lb (111.1 kg).   Neck in inches:      Wt Readings from Last 6 Encounters:   09/10/24 245 lb (111.1 kg)   05/09/24 233 lb (105.7 kg)   04/10/23 280 lb (127 kg)   02/29/20 240 lb (108.9 kg)     BP Readings from Last 3 Encounters:   09/10/24 132/78   05/09/24 132/80   04/12/23 138/72     Pulse Readings from Last 3 Encounters:    09/10/24 74   24 78   23 82     SpO2 Readings from Last 3 Encounters:   09/10/24 97%   24 98%   23 99%      Patient weight not recorded    Vital signs reviewed.  Physical Exam  Vitals and nursing note reviewed.   Constitutional:       Appearance: Normal appearance.   HENT:      Head: Normocephalic and atraumatic.      Right Ear: External ear normal.      Left Ear: External ear normal.   Cardiovascular:      Rate and Rhythm: Normal rate and regular rhythm.      Heart sounds: Heart sounds are distant.   Pulmonary:      Effort: Pulmonary effort is normal. No respiratory distress.      Breath sounds: Examination of the right-lower field reveals decreased breath sounds. Examination of the left-lower field reveals decreased breath sounds. Decreased breath sounds present.   Musculoskeletal:      Cervical back: Normal range of motion and neck supple.      Right lower le+ Pitting Edema present.      Left lower le+ Pitting Edema present.   Neurological:      General: No focal deficit present.      Mental Status: She is alert and oriented to person, place, and time.   Psychiatric:         Attention and Perception: Attention and perception normal.         Mood and Affect: Mood and affect normal.         Speech: Speech normal.         Behavior: Behavior normal. Behavior is cooperative.         Thought Content: Thought content normal.         Cognition and Memory: Cognition and memory normal.         Judgment: Judgment normal.         ASSESSMENT AND PLAN:   1. FADI (obstructive sleep apnea)- mild, AHI 7.4  - Mask refit today, possibly to nasal cushion  - Adjust humidifier setting  - Order chinstrap  - 1 banana/day to help with leg cramps  - RTO in 3 months    Patient is using and benefiting from regular cpap use.  Patient was instructed to clean equipment on a weekly basis.  Patient was instructed to keep up to date with supplies.  Patient was informed to avoid drowsy driving, or using heavy  machinery.    2. Insomnia due to medical condition    3. Daytime hypersomnolence  - ESS 17/24 today  - Reassess once CHF is better controlled    4. Gastroesophageal reflux disease without esophagitis    5. Acute on chronic systolic heart failure (HCC)  - Will call the cardiologist ASAP   - Vitals are stable in office, does not require ER evaluation    6. Stage 3a chronic kidney disease (HCC)      Patient Instructions   Consider 1 banana a day to help with leg cramps    ----------------------------    CPAP/BiPAP Instructions:    Please be advised:   Do not drive while sleepy   Take CPAP/BiPAP machine to any procedure that requires sedation     When should I clean my machine & supplies?   Clean mask cushions or nasal pillow, headgear, tubing, and humidifier chamber with mild soap (Priya) and water   If water chamber has hard water buildup (white crust), soak in warm water & vinegar mix 50/50.   Rinse and hang dry     DAILY   Wipe mask cushions or nasal pillow   Empty & rinse water chamber- refill with distilled water     WEEKLY   Clean mask cushions or nasal pillow, headgear, tubing, and humidifier chamber with mild soap (Priya) and water   If water chamber has hard water buildup (white crust), soak in warm water & vinegar mix 50/50.   Rinse and hang dry     When should supplies be replaced?   Contact your home care company for replacement supplies, or if your machine is malfunctioning   *Below is a general guideline of what we recommend. Replacement of supplies differs depending on your insurance company*   MONTHLY: Replace filter and mask cushion   6 MONTHS: Replace headgear and tubing     Travel Tips   Keep CPAP/BiPAP in original bag when traveling, and place luggage tag on bag   Most airlines consider CPAP/BiPAP to be a medical device, therefore it is a free carry-on item   If unable to get distilled water, bottled water is safe while traveling. DO NOT use tap water   When traveling outside the [place] only a power  adapter is necessary (CPAP can operate without a converter), bring an extension cord   Consider purchasing or renting a travel CPAP (not covered by insurance)     Dry Mouth/Nose   Turn up the humidity on your machine (select \"Options\" from the home screen)   Place a cool mist humidifier at your bedside   Over-the-counter remedies: Biotene, XyliMelts, NasoGel     Air Leak   Try adjusting your mask/headgear while laying in sleeping position vs. sitting up   Wash and dry your face prior to putting your mask on   If applicable: shave facial hair at night (or before wearing CPAP)   Purchase \"RemZzzs\" (through [employer], [employer], or remzzzsNeedFeed)   100% cotton knit barrier that goes between your mask cushion and your skin   Replace your mask cushion (at least once per month) and/or headgear (every 3-6 months)     Nasal Congestion   CPAP therapy can cause nasal passages to dry out, & mucous membranes try to protect the nasal passages by producing excess mucous, so congestion results.   Over-the counter remedies: Flonase, Nasacort, Sinus Rinses (Neti-Pot), DO NOT USE Afrin   Try a mask that goes over the nose and mouth     Skin Irritation   Clean supplies regularly (Citrus II Mask Wipes, Control III disinfectant solution)   Try over the counter creams such as hydrocortisone 1% (apply in the morning after showering)   Your headgear may be too tight, replace supplies so you don't need to adjust so tightly   Try RemZzzs (100% cotton knit barrier that goes between your mask cushion and your skin)     Gas/Bloating   Try a different sleeping position to keep air out of the stomach. Lay on the left side or rotate to the right side. Incline with pillows or lay flat.   Over-the-counter remedies: Simethicone           Independent interpretation of Sleep Download as defined above.  Continue with Rx management of Sleep apnea with PAP therapy.    COMPLIANCE is required by insurance for 4 hours a night most nights of the  week.    Advised if still with sleep apnea and not using CPAP has a 7 fold increase in risk of heart attack, stroke, abnormal heart rhythm  and death,  increased risk of driving accidents.     Advised to refrain from driving when sleepy.      Recommend weight loss, and maintain and optimal BMI with Exercise 30 minutes most days to target heart rate .     Advised patient to change filters,masks,hoses  and tubes and equiptment on a  regular schedule.    Filters and seals shall be changed every 1 month,  Hoses every 3 months,   CPAP mask and humidifier chamber changed every 6 month  with the durable medical equipment provider.         Meds & Refills for this Visit:  Requested Prescriptions      No prescriptions requested or ordered in this encounter       Outcome: Parent verbalizes understanding. Parent is notified to call with any questions, complications, allergies, or worsening or changing symptoms.  Parent is to call with any side effects or complications from the treatments as a result of today.     \" This note was created utilizing Dragon speech recognition software.  Please excuse any grammatical errors. Call my office if you have any questions regarding this note. \"     Dot MINAYA LPN  9/9/2024  9:21 AM

## 2024-09-10 ENCOUNTER — OFFICE VISIT (OUTPATIENT)
Facility: CLINIC | Age: 74
End: 2024-09-10
Payer: MEDICARE

## 2024-09-10 VITALS
WEIGHT: 245 LBS | HEIGHT: 63 IN | DIASTOLIC BLOOD PRESSURE: 78 MMHG | SYSTOLIC BLOOD PRESSURE: 132 MMHG | OXYGEN SATURATION: 97 % | HEART RATE: 74 BPM | BODY MASS INDEX: 43.41 KG/M2 | RESPIRATION RATE: 14 BRPM

## 2024-09-10 DIAGNOSIS — K21.9 GASTROESOPHAGEAL REFLUX DISEASE WITHOUT ESOPHAGITIS: ICD-10-CM

## 2024-09-10 DIAGNOSIS — G47.01 INSOMNIA DUE TO MEDICAL CONDITION: ICD-10-CM

## 2024-09-10 DIAGNOSIS — G47.33 OSA (OBSTRUCTIVE SLEEP APNEA): Primary | ICD-10-CM

## 2024-09-10 DIAGNOSIS — G47.19 DAYTIME HYPERSOMNOLENCE: ICD-10-CM

## 2024-09-10 DIAGNOSIS — N18.31 STAGE 3A CHRONIC KIDNEY DISEASE (HCC): ICD-10-CM

## 2024-09-10 DIAGNOSIS — I50.23 ACUTE ON CHRONIC SYSTOLIC HEART FAILURE (HCC): ICD-10-CM

## 2024-09-10 PROCEDURE — 99214 OFFICE O/P EST MOD 30 MIN: CPT | Performed by: PHYSICIAN ASSISTANT

## 2024-09-10 NOTE — PROGRESS NOTES
PT using nasal pillows but does not like the feel of it in her nostril , gives her anxiety.  Pt would like to try under the nose mask. Pt fitted with Resmed Airfit N30i small under the nose mask which fit her well.   Pt was able to demonstrate on how to properly put on  Airfit N30i UTN and how to adjust the headgear.  Pt instructed  on proper cleaning and maintenance of Airfit N30i UTN mask.   Instructed pt to update the DME company with the type of mask pt will be using so that DME will send  the right mask in pt's  next cpap re supply shipment.  Pt verbalized understanding of all instructions.

## 2024-09-20 ENCOUNTER — LAB ENCOUNTER (OUTPATIENT)
Dept: LAB | Facility: HOSPITAL | Age: 74
End: 2024-09-20
Attending: INTERNAL MEDICINE
Payer: MEDICARE

## 2024-09-20 DIAGNOSIS — I50.33 ACUTE ON CHRONIC DIASTOLIC HEART FAILURE (HCC): Primary | ICD-10-CM

## 2024-09-20 DIAGNOSIS — I10 HYPERTENSION: ICD-10-CM

## 2024-09-20 LAB
ANION GAP SERPL CALC-SCNC: 5 MMOL/L (ref 0–18)
BUN BLD-MCNC: 23 MG/DL (ref 9–23)
BUN/CREAT SERPL: 22.1 (ref 10–20)
CALCIUM BLD-MCNC: 9.4 MG/DL (ref 8.7–10.4)
CHLORIDE SERPL-SCNC: 109 MMOL/L (ref 98–112)
CO2 SERPL-SCNC: 27 MMOL/L (ref 21–32)
CREAT BLD-MCNC: 1.04 MG/DL
EGFRCR SERPLBLD CKD-EPI 2021: 56 ML/MIN/1.73M2 (ref 60–?)
FASTING STATUS PATIENT QL REPORTED: NO
GLUCOSE BLD-MCNC: 94 MG/DL (ref 70–99)
OSMOLALITY SERPL CALC.SUM OF ELEC: 295 MOSM/KG (ref 275–295)
POTASSIUM SERPL-SCNC: 3.9 MMOL/L (ref 3.5–5.1)
SODIUM SERPL-SCNC: 141 MMOL/L (ref 136–145)

## 2024-09-20 PROCEDURE — 36415 COLL VENOUS BLD VENIPUNCTURE: CPT

## 2024-09-20 PROCEDURE — 80048 BASIC METABOLIC PNL TOTAL CA: CPT

## 2024-10-08 ENCOUNTER — LAB ENCOUNTER (OUTPATIENT)
Dept: LAB | Facility: HOSPITAL | Age: 74
End: 2024-10-08
Attending: INTERNAL MEDICINE
Payer: MEDICARE

## 2024-10-08 DIAGNOSIS — I50.32 CHRONIC DIASTOLIC HEART FAILURE (HCC): ICD-10-CM

## 2024-10-08 DIAGNOSIS — I50.33 ACUTE ON CHRONIC DIASTOLIC HEART FAILURE (HCC): Primary | ICD-10-CM

## 2024-10-08 LAB
ANION GAP SERPL CALC-SCNC: 5 MMOL/L (ref 0–18)
BUN BLD-MCNC: 14 MG/DL (ref 9–23)
BUN/CREAT SERPL: 11.7 (ref 10–20)
CALCIUM BLD-MCNC: 9.2 MG/DL (ref 8.7–10.4)
CHLORIDE SERPL-SCNC: 108 MMOL/L (ref 98–112)
CO2 SERPL-SCNC: 29 MMOL/L (ref 21–32)
CREAT BLD-MCNC: 1.2 MG/DL
EGFRCR SERPLBLD CKD-EPI 2021: 47 ML/MIN/1.73M2 (ref 60–?)
FASTING STATUS PATIENT QL REPORTED: YES
GLUCOSE BLD-MCNC: 100 MG/DL (ref 70–99)
OSMOLALITY SERPL CALC.SUM OF ELEC: 295 MOSM/KG (ref 275–295)
POTASSIUM SERPL-SCNC: 3.6 MMOL/L (ref 3.5–5.1)
SODIUM SERPL-SCNC: 142 MMOL/L (ref 136–145)

## 2024-10-08 PROCEDURE — 36415 COLL VENOUS BLD VENIPUNCTURE: CPT

## 2024-10-08 PROCEDURE — 80048 BASIC METABOLIC PNL TOTAL CA: CPT

## 2024-10-29 ENCOUNTER — OFFICE VISIT (OUTPATIENT)
Dept: INTERNAL MEDICINE CLINIC | Facility: CLINIC | Age: 74
End: 2024-10-29
Payer: COMMERCIAL

## 2024-10-29 VITALS
WEIGHT: 229 LBS | HEIGHT: 61 IN | DIASTOLIC BLOOD PRESSURE: 62 MMHG | RESPIRATION RATE: 16 BRPM | SYSTOLIC BLOOD PRESSURE: 118 MMHG | HEART RATE: 82 BPM | BODY MASS INDEX: 43.23 KG/M2

## 2024-10-29 DIAGNOSIS — Z51.81 THERAPEUTIC DRUG MONITORING: ICD-10-CM

## 2024-10-29 DIAGNOSIS — G47.33 OSA (OBSTRUCTIVE SLEEP APNEA): Primary | ICD-10-CM

## 2024-10-29 DIAGNOSIS — N18.31 STAGE 3A CHRONIC KIDNEY DISEASE (HCC): ICD-10-CM

## 2024-10-29 DIAGNOSIS — I50.23 ACUTE ON CHRONIC SYSTOLIC HEART FAILURE (HCC): ICD-10-CM

## 2024-10-29 DIAGNOSIS — I10 HYPERTENSION, UNSPECIFIED TYPE: ICD-10-CM

## 2024-10-29 DIAGNOSIS — E66.01 OBESITY, MORBID, BMI 40.0-49.9 (HCC): ICD-10-CM

## 2024-10-29 PROBLEM — I50.9 CONGESTIVE HEART FAILURE (HCC): Status: ACTIVE | Noted: 2024-03-20

## 2024-10-29 PROBLEM — E78.6 FAMILIAL HYPOALPHALIPOPROTEINEMIA: Status: ACTIVE | Noted: 2024-10-29

## 2024-10-29 PROBLEM — M54.9 CHRONIC BACK PAIN: Status: ACTIVE | Noted: 2024-10-29

## 2024-10-29 PROBLEM — M47.12 CERVICAL ARTHRITIS WITH MYELOPATHY: Status: ACTIVE | Noted: 2018-10-08

## 2024-10-29 PROBLEM — E03.9 HYPOTHYROIDISM: Status: ACTIVE | Noted: 2024-10-29

## 2024-10-29 PROBLEM — E78.2 HYPERLIPIDEMIA, MIXED: Status: ACTIVE | Noted: 2024-10-22

## 2024-10-29 PROBLEM — K21.00 GASTRO-ESOPHAGEAL REFLUX DISEASE WITH ESOPHAGITIS: Status: ACTIVE | Noted: 2024-10-29

## 2024-10-29 PROBLEM — F32.A DEPRESSIVE DISORDER: Status: ACTIVE | Noted: 2023-08-18

## 2024-10-29 PROBLEM — E55.9 VITAMIN D DEFICIENCY: Status: ACTIVE | Noted: 2024-10-29

## 2024-10-29 PROBLEM — G89.29 CHRONIC BACK PAIN: Status: ACTIVE | Noted: 2024-10-29

## 2024-10-29 PROCEDURE — 99214 OFFICE O/P EST MOD 30 MIN: CPT | Performed by: NURSE PRACTITIONER

## 2024-10-29 RX ORDER — SEMAGLUTIDE 0.25 MG/.5ML
0.25 INJECTION, SOLUTION SUBCUTANEOUS WEEKLY
Qty: 2 ML | Refills: 0 | Status: SHIPPED | OUTPATIENT
Start: 2024-10-29 | End: 2024-11-20

## 2024-10-29 NOTE — PATIENT INSTRUCTIONS
Welcome to the [facility]!!  Thank you for placing your trust in our health care team, I look forward to working with you along this journey to better health!  Next steps:     1.  Schedule a personal nutrition consultation with one of our registered dieticians. Bring along your food journal (3 days minimum). See journal options below.  2.  Will see if we can get wegovy approved for cardiac indication   3. Check out aqua therapy      Please try to work on the following dietary changes this first month:  Daily protein recommendation to start: 60 grams  Daily carbohydrate: <125g  Daily calories: 1,500  1.  Drink water with meals and throughout the day, cut down on soda and/or juice if consumed. Consider flavored water options like Bubbly, Spindrift, Hint and Aiyana.  2.  Eat breakfast daily and focus on having protein with each meal, examples include: greek yogurt, cottage cheese, hard boiled egg, whole grain toast with peanut butter.   3.  Reduce refined carbohydrates and sugars which includes items such as sweets, as well as rice, pasta, and bread and make sure to choose whole grain options when having them with just 1 serving per meal about the size of your inner palm.  4.  Consume non starchy veggies daily working towards making them a good 50% of your daily food intake. Add them to lunch and dinner consistently.  5.  Optional can start a daily probiotic: VSL#3, (order on line at www.vsl3.com). Take 1 capsule daily with water for 30 days, then reduce to 1 every other day (this will reduce the cost). Capsules can be left out for 2 weeks, but then must be refrigerated.      Please download noemi My Fitness Pal, LoseIt! Or Net Diary to monitor daily dietary intake and you will be able to see if you are eating the right amount of calories or too much or too little which would hinder weight loss. Additionally this will help to see your daily carbohydrate and protein intake. When you set the  johnna up choose 1-2 lbs/week as a goal.  Keeping a paper food journal is an option as well to remain accountable for your choices- this is the start to mindful eating! A low calorie diet has been consistently shown to support weight loss.     Continue or start exercising to help establish a routine. If not already exercising begin with 1 day and progress as able with long-term goal of 30 minutes 5 days a week at a minimum.     Meditation daily can help manage and control stress. Chronic stress can make weight loss difficult.  Exercising is one way to help with stress, but meditation using the CALM Johnna or another comparable alternative can be done in your home or place of work with little time commitment. This Johnna can also help work on behavior change and improve sleep. Check out the segment under Calm Masterclass and listen to The 4 Pillars of Health. A great way to begin learning about the foundation of lifestyle with practical tips to use in your every day.     Check out www.yourweightmatters.org blog for continued daily support and education along this weight loss journey!    Patient Resources:     Personal Training/Fitness Classes/Health Coaching     [facility] @ [email or web address] Full fitness center with group fitness and personal training. Discount available as client of [facility].  Health Coaching and Personal Training with Jade Metcalf at our [facility]- individual weekly coaching with option to add personal training and small group fitness classes targeted at weight loss- [phone] and/or email @ [email or web address]  360FIT [place] [email or web address] [email or web address] [phone] and/or email Gwen at [email or web address]  [facility] Fitness Centers with multiple locations: [employer] (www.Shipping Easy), Tucker Blair [employer] (www.[email or web address].[email or web address]), Charity Engine  Fitness (www.Commerce [email or web address].Bloomerang), The Exercise  (www.exercisecoach.Bloomerang)     Online Fitness  Fitness  on Utube  Fit in 10 DVD series- www.eftav00TGC.com  Sit and Be Fit - Chair exercise series Www.sitandbefit.org  Hip Hop Fit with Soren Nicole at www.hiphopfit.net     Apps for on the Go Fitness  [place] 7 Minute Workout (orange box with white 7) - free on the go HIIT training johnna  Peloton Johnna @ www.onepeloton.com     Nutrition Trackers and Tools  LoseIT! And My Fitness Pal apps and on line for tracking nutrition  NOOM - virtual health coaching  FitFoundation (healthy meals on the go) in [place] @ wwwOmniatapfovkieqdbdem5jSeeonic  Juan COATS @ Picaticbistromd.com and Htjmlk35 (keto and low carb plans recommended) @ www.hjjkvu25.com, Metabolic Meals @ www.SensorTranMeals.Bloomerang - individual prepared meals to go  Gobble, Blue Apron, Home , Every Plate, Sunbasket- on line meal delivery programs for preparation at home  [place]al [place] in [place] for homemade meals to go @ wwwOmniatamealCO3 Ventures  Diet Doctor @ www.dietdoctor.com - low carb swaps  YuLink_A_Media Devices - meal prep and planning johnna (www.yummly.com)     Stress Management/Behavior/Mindful Eating  CALM meditation johnna (www.calm.com)  Headspace  Am I Hungry? Mindful eating virtual  johnna  Www.yourweightmatters.org - Obesity Action Coalition sponsored Blog posts daily  Motivation johnna (black box with white \")- daily supportive messages sent to your phone     Books/Video Education/Podcasts  Mindless Eating by Bennie Smiley  Why We Get Sick by Nik Gunn (a book about insulin resistance)  Atomic Habits by Ash May (a book about taking small steps to promote greater behavior change)   Can't Hurt Me by Saad Bajwa (a book exploring the power of discipline in achieving your goals)  The End of Dieting: How to Live for Life by Dr. Jovon Noel M.D. or listen to The SocialMatica Podcast Episode 63: Understanding \"Nutritarian\" Eating w/Dr. Jovon Noel  Your Body in  Balance: The New Science of Food, Hormones, and Health by Dr. Antwon Hudson  The Menopause Diet Plan by Riddhi Ortega and Ashley Nuñez  The Complete Guide to fasting by Dr. Manzo  Sugar, Salt & Fat by Ariadne Soto, Ph.D, R.D.  Weight Loss Surgery Will Not Treat Food Addiction by Silvia Simeno Ph.D  The Game Changers- BrowseLabsix Documentary on plant based nutrition  Fed Up - documentary about obesity (Free on Utube)  The Truth About Sugar - documentary on sugar (Free on Utube, https://youIdealSeatu.be/3Y6kekkIN3a)  The Dr. Archibald T5 Wellness Plan by Dr. Tho Archibald MD  Fitlosophy Fitspiration - journal to better health (found at [employer] in fitness aisle)  What Happened to You?- a look at the impact trauma has on behavior written by Audra Gonzalez and Dr. John Orosco  Whole Again by Jaime Ellis - discovering your true self after trauma  Glory Rice talk on [employer], The Call to Courage  Podcasts: The Exam Room by the Physician's Committee, Nutrition Facts by Dr. Hall    We are here to support you with weight loss, but please remember that you still need your primary care provider for your routine health maintenance.

## 2024-10-29 NOTE — PROGRESS NOTES
HISTORY OF PRESENT ILLNESS  Chief Complaint   Patient presents with    Weight Problem     Recommendation from other department , never try meds and open for med. Not interest in getting surgery       Carly Contreras is a 74 year old female new to our office today for initiation of medical weight loss program.  Patient presents today with c/o excess weight. Referred by, pulmonary specialist (Dr. Tafoya)     Has been struggling weight gain for the past 10 yrs  +Chronic back, knees pain (arthritis)- difficult to exercise  Is currently seeing cardiology - Dr. Lincoln Phan ([place]) for chronic heart failure   Has 2 daughters at bedside today     Denies chest pain, shortness of breath, dizziness, blurred vision, headache, paresthesia, nausea/vomiting.     Reason/goal for weight loss: To be at a comfortable weight where i can move around and get relief on my kneews in 6 months and To be able to function with family i dont get to do much because i cant walk far my weight right now stops me from having a comfortable life in 1 year   Triggers for weight gain? Medication and Injury  Previous weight loss programs? NO  Previous weight loss medications?  none    Reviewed [facility] patient contract: Readiness for Lifestyle change: 10/10, Interest in Medication: 10/10, Bariatric surgery interest: 10/10    Weight  Starting weight: 229  Max weight: 280  Lowest weight: 229    Wt Readings from Last 6 Encounters:   10/29/24 229 lb (103.9 kg)   09/10/24 245 lb (111.1 kg)   05/09/24 233 lb (105.7 kg)   04/10/23 280 lb (127 kg)   02/29/20 240 lb (108.9 kg)        Typical diet   Breakfast Lunch Dinner Snacks Fluids   Egg, toast, tuna (eats around 11:00am)  skips   Pasta, rice, beans, meat, soup,   Water: adequate   Soda: +2 regular pepsi                   Juice:  Coffee/Tea:     Portion: medium   Eats 3 meals per day: yes   Number of restaurant or fast food meals/week: 0 meals/week    Social hx and lifestyle reviewed:    Work: not working (has  caretaker- upstairs, that does help with cooking)   Marital status: single   Support: yes  Tobacco use: none  ETOH use: 0  per/week  Supplements: none  Exercise: 1 day per week- walking   Stress level: 8/10  Sleep hours and integrity: 6 Hours per night    MEDICAL HISTORY  PMH reviewed:   Cardiac disorders: HTN, chronic congestive heart failure, high cholesterol     Depression/anxiety: +  Glaucoma: negative  Kidney stones: negative  Eating disorder: negative  Migraines/seizures: negative  Joint-related conditions: back pain, knee pain (hx of bilat. Knee replacement)   Liver disease: negative  Thyroid disease: negative  Constipation: +  Diabetes: negative  Sleep Apnea hx: FADI- using cpap machine at night   Cancer hx: negative  DVT: negative  Family or personal history of Pancreatic issues / Medullary Thyroid Cancer: sister with thyroid cancer   History of bariatric surgery: negative    FMH reviewed: obesity in parent/s or sibling: +    REVIEW OF SYSTEMS  GENERAL: feels well otherwise, and negative fatigue   SKIN: denies any rashes to skin folds  HEENT: snoring- yes; denies neck thickening  LUNGS: denies shortness of breath with exertion, no apnea  CARDIOVASCULAR: denies chest pain on exertion, denies palpitations or pedal edema  GI: denies abdominal pain, distention, No N/V/D/C  MUSCULOSKELETAL: +chronic back pain, joint pains (bilat knees)  NEURO: denies headaches or dizziness  ENDOCRINE: denies any excess hunger, urination or thirst, denies any purple striae  PSYCH: denies change in behavior or mood, hx of depression/anxiety    EXAM  /62   Pulse 82   Resp 16   Ht 5' 1\" (1.549 m)   Wt 229 lb (103.9 kg)   BMI 43.27 kg/m² , Percent body fat: F >32% Female 51%;  visceral fat 23 Muscle Mass: 22 lbs%       GENERAL: well developed, well nourished, in no apparent distress  SKIN: warm, pink, dry without rashes to exposed area   EYES: conjunctiva pink, sclera non icteric, PERRLA  HEENT: atraumatic, normocephalic,  O/p: Mallampati score- 2  NECK: supple, non tender, no adenopathy, no thyromegaly  LUNGS: CTA in all fields, breathing non labored  CARDIO: RRR without murmur, normal S1 and S2 without clicks or gallops, no pedal edema  GI: +BS, soft, no masses, HSM or tenderness  EXTREMITIES: grossly intact  NEURO: Oriented times three, full ROM of bilateral UE/LE  PSYCH: pleasant, cooperative, normal mood and affect, no si/hi    Lab Results   Component Value Date     (H) 10/08/2024    BUN 14 10/08/2024    BUNCREA 11.7 10/08/2024    CREATSERUM 1.20 (H) 10/08/2024    ANIONGAP 5 10/08/2024    GFRNAA 56 (L) 02/29/2020    GFRAA 64 02/29/2020    CA 9.2 10/08/2024    OSMOCALC 295 10/08/2024    ALKPHO 86 04/03/2024    AST 19 04/03/2024    ALT 14 04/03/2024    BILT 0.6 04/03/2024    TP 7.4 04/03/2024    ALB 4.5 04/03/2024    GLOBULIN 2.9 04/03/2024     10/08/2024    K 3.6 10/08/2024     10/08/2024    CO2 29.0 10/08/2024     Lab Results   Component Value Date     04/03/2024    A1C 5.6 04/03/2024     Lab Results   Component Value Date    CHOLEST 139 04/03/2024    TRIG 134 04/03/2024    HDL 49 04/03/2024    LDL 67 04/03/2024    VLDL 20 04/03/2024    NONHDLC 90 04/03/2024     Lab Results   Component Value Date    B12 557 04/03/2024     Lab Results   Component Value Date    VITD 25.6 (L) 04/03/2024       Medications Ordered Prior to Encounter[1]    ASSESSMENT/PLAN    ICD-10-CM    1. Therapeutic drug monitoring  Z51.81       2. Obesity, morbid, BMI 40.0-49.9 (HCC)  E66.01       3. Acute on chronic systolic heart failure (HCC)  I50.23       4. FADI (obstructive sleep apnea)  G47.33       5. Hypertension, unspecified type  I10       6. Stage 3a chronic kidney disease (HCC)  N18.31         Initial Weight Data and Goal Weight Loss:  Weight Calculations  Initial Weight: 229 lbs  Initial Weight Date: 10/29/24  Today's Weight: 229 lbs  5% Goal: 11.45  10% Goal: 22.9  Total Weight Loss: 0 lbs  Initial consult: 229 lbs on  10/2024, Down  Lb:  lbs total     PLAN   Will start medications: wegovy 0.25mg weekly x 4 weeks- for cardiac indication (please attach Bernice cardiology report)   --advised of side effects and adverse effects of this medication  Contradictions: avoid stimulant medications (based on age and heart hx), watch kidney function  Body composition test results given   Reviewed labs  Continue with vitamin d OTC   HTN/chronic heart failure, stable, follows with cardiology   HLD  stable, follows with PCP   Joint pain, recommended aqua therapy   FADI- continue with nightly cpap machine  Decrease carbs, increase protein, no skipping meals   Needs to incorporate exercise regimen FITTE: ACSM recommendations (150-300 minutes/ week in active weight loss)   Follow up with dietitian and psychologist as recommended.  Discussed the role of sleep and stress in weight management.  Counseled on comprehensive weight loss plan including attention to nutrition, exercise and behavior/stress management for success. See patient instruction below for more details.    Total time spent on chart review, pre-charting, obtaining history, counseling, and educating, reviewing labs was 50 minutes.       NOTE TO PATIENT: The 21st Century Cures Act makes clinical notes like these available to patients in the interest of transparency. Clinical notes are medical documents used by physicians and care providers to communicate with each other. These documents include medical language and terminology, abbreviations, and treatment information that may sound technical and at times possibly unfamiliar. In addition, at times, the verbiage may appear blunt or direct. These documents are one tool providers use to communicate relevant information and clinical opinions of the care providers in a way that allows common understanding of the clinical context.     Weight Loss Contract reviewed and signed today 10/29/2024    Patient Instructions   Welcome to the [facility]!!  Thank you for placing your trust in our health care team, I look forward to working with you along this journey to better health!  Next steps:     1.  Schedule a personal nutrition consultation with one of our registered dieticians. Bring along your food journal (3 days minimum). See journal options below.  2.  Will see if we can get wegovy approved for cardiac indication   3. Check out aqua therapy      Please try to work on the following dietary changes this first month:  Daily protein recommendation to start: 60 grams  Daily carbohydrate: <125g  Daily calories: 1,500  1.  Drink water with meals and throughout the day, cut down on soda and/or juice if consumed. Consider flavored water options like Bubbly, Spindrift, Hint and Aiyana.  2.  Eat breakfast daily and focus on having protein with each meal, examples include: greek yogurt, cottage cheese, hard boiled egg, whole grain toast with peanut butter.   3.  Reduce refined carbohydrates and sugars which includes items such as sweets, as well as rice, pasta, and bread and make sure to choose whole grain options when having them with just 1 serving per meal about the size of your inner palm.  4.  Consume non starchy veggies daily working towards making them a good 50% of your daily food intake. Add them to lunch and dinner consistently.  5.  Optional can start a daily probiotic: VSL#3, (order on line at www.vsl3.com). Take 1 capsule daily with water for 30 days, then reduce to 1 every other day (this will reduce the cost). Capsules can be left out for 2 weeks, but then must be refrigerated.      Please download noemi My Fitness Pal, LoseIt! Or Net Diary to monitor daily dietary intake and you will be able to see if you are eating the right amount of calories or too much or too little which would hinder weight loss. Additionally this will help to see your daily carbohydrate and protein intake. When you set the noemi up choose 1-2  lbs/week as a goal.  Keeping a paper food journal is an option as well to remain accountable for your choices- this is the start to mindful eating! A low calorie diet has been consistently shown to support weight loss.     Continue or start exercising to help establish a routine. If not already exercising begin with 1 day and progress as able with long-term goal of 30 minutes 5 days a week at a minimum.     Meditation daily can help manage and control stress. Chronic stress can make weight loss difficult.  Exercising is one way to help with stress, but meditation using the CALM Johnna or another comparable alternative can be done in your home or place of work with little time commitment. This Johnna can also help work on behavior change and improve sleep. Check out the segment under Calm Masterclass and listen to The 4 Pillars of Health. A great way to begin learning about the foundation of lifestyle with practical tips to use in your every day.     Check out www.yourweightmatters.org blog for continued daily support and education along this weight loss journey!    Patient Resources:     Personal Training/Fitness Classes/Health Coaching     [facility] @ [email or web address] Full fitness center with group fitness and personal training. Discount available as client of [employer] [facility].  Health Coaching and Personal Training with Jade Metcalf at our [place] [facility]- individual weekly coaching with option to add personal training and small group fitness classes targeted at weight loss- [phone] and/or email @ [email or web address]  [employer] [place] [email or web address]. Group Fitness [phone] and/or email Gwen at [email or web address]  Located within [facility] Fitness Centers with multiple locations: Wave Accounting (www.Bubbleball), Agency Spotter [employer] (www.MeetCute.UmbaBox), Quadrille IngÃƒÂ©nierie  (www.Forrst.Knack.it), The Exercise  (www.exercisecoach.Knack.it)     Online Fitness  Fitness  on Utube  Fit in 10 DVD series- www.zzjsn40IZD.com  Sit and Be Fit - Chair exercise series Www.sitandbefit.org  Hip Hop Fit with Soren Nicole at www.hiphopfit.net     Apps for on the Go Fitness  [place] 7 Minute Workout (orange box with white 7) - free on the go HIIT training johnna  Peloton Johnna @ www.onepeloton.com     Nutrition Trackers and Tools  LoseIT! And My Fitness Pal apps and on line for tracking nutrition  NOOM - virtual health coaching  FitFoundation (healthy meals on the go) in [place] @ wwwLaunchTracktasycyzwgdvhv2jRapt  Juan COATS @ Synedgenbistromd.com and Wrnocw15 (keto and low carb plans recommended) @ wwwLaunchTrackhkfyoi91.com, Metabolic Meals @ www.Allied Pacific Sports NetworkMeals.Knack.it - individual prepared meals to go  Gobble, Blue Apron, Home , Every Plate, Sunbasket- on line meal delivery programs for preparation at home  [place]al [place] in [place] for homemade meals to go @ wwwLaunchTrackmealQcept Technologies  Diet Doctor @ www.dietdoctor.com - low carb swaps  YuJuntos Finanzas - meal prep and planning johnna (www.yummly.com)     Stress Management/Behavior/Mindful Eating  CALM meditation johnna (www.calm.com)  Headspace  Am I Hungry? Mindful eating virtual  johnna  Www.yourweightmatters.org - Obesity Action Coalition sponsored Blog posts daily  Motivation johnna (black box with white \")- daily supportive messages sent to your phone     Books/Video Education/Podcasts  Mindless Eating by Bennie Smiley  Why We Get Sick by Nik Gunn (a book about insulin resistance)  Atomic Habits by Ash May (a book about taking small steps to promote greater behavior change)   Can't Hurt Me by Saad Bajwa (a book exploring the power of discipline in achieving your goals)  The End of Dieting: How to Live for Life by Dr. Jovon Noel M.D. or listen to The Adbongo Podcast Episode 63: Understanding \"Nutritarian\" Eating w/Dr. Jovon Noel  Your Body in Balance: The  New Science of Food, Hormones, and Health by Dr. Antwon Hudson  The Menopause Diet Plan by Riddhi Ortega and Ashley Nuñez  The Complete Guide to fasting by Dr. Manzo  Sugar, Salt & Fat by Ariadne Soto, Ph.D, R.D.  Weight Loss Surgery Will Not Treat Food Addiction by Silvia Simeon Ph.D  The Game Changers- Vettery Documentary on plant based nutrition  Fed Up - documentary about obesity (Free on Utube)  The Truth About Sugar - documentary on sugar (Free on Utube, https://youEncentiv Energyu.be/3C4apsnDR0b)  The Dr. Archibald T5 Wellness Plan by Dr. Tho Archibald MD  Fitlosophy Fitspiration - journal to better health (found at [employer] in fitness aisle)  What Happened to You?- a look at the impact trauma has on behavior written by Audra Gonzalez and Dr. John Orosco  Whole Again by Jaime Ellis - discovering your true self after trauma  Glory Rice talk on Vettery, The Call to Courage  Podcasts: The Exam Room by the Physician's Committee, Nutrition Facts by Dr. Hall    We are here to support you with weight loss, but please remember that you still need your primary care provider for your routine health maintenance.      No follow-ups on file.    Patient verbalizes understanding.    LASHANDA Beal           [1]   Current Outpatient Medications on File Prior to Visit   Medication Sig Dispense Refill    VENTOLIN  (90 Base) MCG/ACT Inhalation Aero Soln Inhale 2 puffs into the lungs every 4 to 6 hours as needed.      SYMBICORT 160-4.5 MCG/ACT Inhalation Aerosol       pantoprazole 40 MG Oral Tab EC Take 1 tablet (40 mg total) by mouth every morning.      atorvastatin 20 MG Oral Tab       Dexlansoprazole (DEXILANT) 60 MG Oral Capsule Delayed Release Take 60 mg by mouth daily.      Metoclopramide HCl 10 MG Oral Tab Take 1 tablet (10 mg total) by mouth 3 (three) times daily before meals.      HYDROcodone-acetaminophen  MG Oral Tab Take 1 tablet by mouth every 6 (six) hours as needed for Pain.      hydroCHLOROthiazide  25 MG Oral Tab  (Patient not taking: Reported on 10/29/2024)      amLODIPine Besylate 10 MG Oral Tab Take 2 tablets (20 mg total) by mouth daily. (Patient not taking: Reported on 10/29/2024)       No current facility-administered medications on file prior to visit.

## 2024-12-12 ENCOUNTER — OFFICE VISIT (OUTPATIENT)
Dept: SURGERY | Facility: CLINIC | Age: 74
End: 2024-12-12
Payer: COMMERCIAL

## 2024-12-12 VITALS
WEIGHT: 237 LBS | HEIGHT: 63 IN | SYSTOLIC BLOOD PRESSURE: 186 MMHG | BODY MASS INDEX: 41.99 KG/M2 | HEART RATE: 71 BPM | DIASTOLIC BLOOD PRESSURE: 77 MMHG

## 2024-12-12 DIAGNOSIS — N28.1 RENAL CYST: Primary | ICD-10-CM

## 2024-12-12 PROCEDURE — 1159F MED LIST DOCD IN RCRD: CPT | Performed by: UROLOGY

## 2024-12-12 PROCEDURE — 3078F DIAST BP <80 MM HG: CPT | Performed by: UROLOGY

## 2024-12-12 PROCEDURE — 3077F SYST BP >= 140 MM HG: CPT | Performed by: UROLOGY

## 2024-12-12 PROCEDURE — 99204 OFFICE O/P NEW MOD 45 MIN: CPT | Performed by: UROLOGY

## 2024-12-12 PROCEDURE — 3008F BODY MASS INDEX DOCD: CPT | Performed by: UROLOGY

## 2024-12-12 NOTE — PROGRESS NOTES
formerly [facility]  Initial Office Consultation    HPI:   Carly Contreras is a 74 year old female here today for consultation at the request of, and a copy of this note will be sent to, No primary care provider on file..  She is accompanied by her 2 daughters.    1.  Renal cyst  Patient had an MRI of her lumbar spine without contrast on 2024 through [facility].    Although report does not mention any incidental findings she was told by the \"back doctor\" that she had a cyst on her kidneys.    Seen by PCP.  She reports that she is scheduled for a CT of the abdomen with and without IV contrast on 2024 for further evaluation..    She reports chronic back pain however also reports right-sided flank pain that radiates to the abdomen.  No gross hematuria or dysuria.  Nonintentional weight loss.    No history of nephrolithiasis.      PAST MEDICAL HISTORY: Chronic back pain.  FADI.  Hypertension.  Hyperlipidemia.  Morbid obesity.  Hypothyroidism.  Degenerative disc disease.  Heart failure.    PAST SURGICAL HISTORY:  x 3.  Knee replacement surgery.  Hysterectomy.  Back surgery.    SOCIAL HISTORY: .  5 daughters.  No smoking or illicit drug use.  No alcohol.  Homemaker.     Family History   Problem Relation Age of Onset    Hypertension Sister     Heart Disease Brother     Cancer Maternal Grandfather      Allergies: Tetanus immune globulin and Tetanus toxoids      REVIEW OF SYSTEMS:  Pertinent positives and negatives per HPI. A 12-point ROS was performed and is otherwise negative.       EXAM:  BP (!) 186/77 (BP Location: Right arm, Patient Position: Sitting, Cuff Size: large)   Pulse 71   Ht 5' 3\" (1.6 m)   Wt 237 lb (107.5 kg)   BMI 41.98 kg/m²     Physical Exam  Constitutional:       General: She is not in acute distress.     Appearance: She is well-developed. She is obese.   HENT:      Head: Normocephalic.   Eyes:      General: No scleral icterus.  Cardiovascular:       Rate and Rhythm: Normal rate.   Pulmonary:      Effort: Pulmonary effort is normal.   Musculoskeletal:      Comments: Uses a cane to ambulate.    Skin:     General: Skin is warm and dry.   Neurological:      Mental Status: She is alert and oriented to person, place, and time.   Psychiatric:         Mood and Affect: Mood normal.         Behavior: Behavior normal.       LABS:  No results found.       IMAGING:    Report of MRI L-spine without contrast from September 2024:    1.  No evidence of high-grade spinal canal or foraminal stenosis.   2.  L1-2 broad-based posterior disc protrusion with mild to moderate spinal canal stenosis and mild   bilateral foraminal stenosis.   3.  L5-S1 moderate right and mild left foraminal stenosis.   4.  Additional lesser findings as above.     I personally reviewed the images and it looks like there is a cystic lesion in the right kidney as follows:          IMPRESSION:  74 year old  female with an incidental finding of a large cystic lesion of the right kidney on MRI of the L-spine obtained without contrast at outside facility September 2024.    She seems to have chronic back pain however also localizes some symptoms to her right flank.    CT abdomen with and without IV contrast reportedly scheduled 12/19/2024.    Reviewed with patient and accompanying daughters.  Differential diagnoses discussed ranging from simple cysts that are usually benign to solid enhancing masses that may be concerning for malignancy.    Management of renal cysts depends on size and symptoms.    Recommend proceeding with CT scan as scheduled and follow-up thereafter to discuss results.    All questions answered.      PLAN:  1.  Proceed with CT abdomen with and without IV contrast as scheduled for next week.  Patient should follow-up thereafter with the images and report to discuss results and further management.    Hosea Farley MD  12/12/2024

## 2025-01-22 ENCOUNTER — OFFICE VISIT (OUTPATIENT)
Dept: SURGERY | Facility: CLINIC | Age: 75
End: 2025-01-22

## 2025-01-22 ENCOUNTER — TELEPHONE (OUTPATIENT)
Dept: SURGERY | Facility: CLINIC | Age: 75
End: 2025-01-22

## 2025-01-22 DIAGNOSIS — N28.1 CYST OF RIGHT KIDNEY: Primary | ICD-10-CM

## 2025-01-22 PROBLEM — E66.01 SEVERE OBESITY (BMI >= 40) (HCC): Chronic | Status: ACTIVE | Noted: 2025-01-22

## 2025-01-22 PROCEDURE — 1160F RVW MEDS BY RX/DR IN RCRD: CPT | Performed by: UROLOGY

## 2025-01-22 PROCEDURE — 1159F MED LIST DOCD IN RCRD: CPT | Performed by: UROLOGY

## 2025-01-22 PROCEDURE — 99214 OFFICE O/P EST MOD 30 MIN: CPT | Performed by: UROLOGY

## 2025-01-22 NOTE — TELEPHONE ENCOUNTER
Received a disk from Dr. Farley, I will take it to the file room to be uploaded to patients chart, I did stick a note on the disk to request tech/person that uploads disk to mail back to patients address afterwards, with patients GE number.

## 2025-01-22 NOTE — PROGRESS NOTES
[facility]  Follow-Up Visit    HPI: Carly Contreras is a 74 year old female presents for a follow up visit. Patient was last seen on 12/12/2024.  Accompanied by her daughter.    INTERVAL HISTORY: Patient following up regarding a right renal mass, incidentally detected on MRI of the L-spine completed 9/2024.    CT of the abdomen and pelvis with and without IV contrast was completed through [facility] on 12/19/2024.    This revealed a 8.9 cm simple right renal cyst.  Otherwise no acute abdominopelvic process.    Again, patient complains of chronic back pain however also reports some right sided flank pain that radiates to the abdomen.  No associated urinary symptoms.  No GI symptoms.      Reviewed past medical, surgical, family, and social history.  Reviewed med list and allergies.      REVIEW OF SYSTEMS:  Pertinent positives and negatives per HPI. A 10-point ROS was performed and is otherwise negative.       EXAM:  There were no vitals taken for this visit.    Physical Exam  Constitutional:       General: She is not in acute distress.     Appearance: She is well-developed. She is obese.   HENT:      Head: Normocephalic.   Eyes:      General: No scleral icterus.  Cardiovascular:      Rate and Rhythm: Normal rate.   Pulmonary:      Effort: Pulmonary effort is normal.   Musculoskeletal:      Comments: Uses a cane to ambulate.   Skin:     General: Skin is warm and dry.   Neurological:      Mental Status: She is alert and oriented to person, place, and time.   Psychiatric:         Mood and Affect: Mood normal.         Behavior: Behavior normal.       PATHOLOGY:  No results found.      LABS:  See HPI.      IMAGING:    CT abdomen pelvis with and without IV contrast (12/19/2024) at [facility].  I personally reviewed the images and agree with the radiology report that reveals:    1.  No acute abdominopelvic process   2.  Simple right renal cyst measuring up to 8.9 cm no follow-up is  recommended.           UROLOGY PROCEDURE:  Not performed today.      IMPRESSION:  74 year old female with an 8.9 cm simple right renal cyst.    Patient with chronic back pain however also endorses right flank pain that radiates to the abdomen.    Discussed with patient accompanying daughter at length.  CT results were reviewed.  Discussed simple appearance of the cyst, with a negligible risk of associated malignancy.    Management options were discussed including observation.  Other options were discussed due to size of the cyst and potential associated symptoms reported by patient.  These include aspiration with possible ablation by interventional radiology versus a robotic assisted laparoscopic cyst decortication.    Discussed rationale, approach, benefits, risks, and alternatives of treatment.    Patient elects cyst aspiration and possible ablation by interventional radiology.    All questions answered.  Referral was placed.    She will return for follow-up as needed.      Hosea Farley MD  1/22/2025

## 2025-02-25 ENCOUNTER — LAB ENCOUNTER (OUTPATIENT)
Dept: LAB | Facility: HOSPITAL | Age: 75
End: 2025-02-25
Attending: RADIOLOGY
Payer: MEDICARE

## 2025-02-25 DIAGNOSIS — Z01.818 PRE-OP TESTING: ICD-10-CM

## 2025-02-25 LAB
ANION GAP SERPL CALC-SCNC: 11 MMOL/L (ref 0–18)
BASOPHILS # BLD AUTO: 0.04 X10(3) UL (ref 0–0.2)
BASOPHILS NFR BLD AUTO: 0.5 %
BUN BLD-MCNC: 17 MG/DL (ref 9–23)
BUN/CREAT SERPL: 14.5 (ref 10–20)
CALCIUM BLD-MCNC: 9.2 MG/DL (ref 8.7–10.4)
CHLORIDE SERPL-SCNC: 107 MMOL/L (ref 98–112)
CO2 SERPL-SCNC: 25 MMOL/L (ref 21–32)
CREAT BLD-MCNC: 1.17 MG/DL
DEPRECATED RDW RBC AUTO: 41.9 FL (ref 35.1–46.3)
EGFRCR SERPLBLD CKD-EPI 2021: 49 ML/MIN/1.73M2 (ref 60–?)
EOSINOPHIL # BLD AUTO: 0.37 X10(3) UL (ref 0–0.7)
EOSINOPHIL NFR BLD AUTO: 4.5 %
ERYTHROCYTE [DISTWIDTH] IN BLOOD BY AUTOMATED COUNT: 12.9 % (ref 11–15)
FASTING STATUS PATIENT QL REPORTED: NO
GLUCOSE BLD-MCNC: 83 MG/DL (ref 70–99)
HCT VFR BLD AUTO: 38.1 %
HGB BLD-MCNC: 12.7 G/DL
IMM GRANULOCYTES # BLD AUTO: 0.02 X10(3) UL (ref 0–1)
IMM GRANULOCYTES NFR BLD: 0.2 %
LYMPHOCYTES # BLD AUTO: 2.59 X10(3) UL (ref 1–4)
LYMPHOCYTES NFR BLD AUTO: 31.7 %
MCH RBC QN AUTO: 30.1 PG (ref 26–34)
MCHC RBC AUTO-ENTMCNC: 33.3 G/DL (ref 31–37)
MCV RBC AUTO: 90.3 FL
MONOCYTES # BLD AUTO: 0.57 X10(3) UL (ref 0.1–1)
MONOCYTES NFR BLD AUTO: 7 %
NEUTROPHILS # BLD AUTO: 4.58 X10 (3) UL (ref 1.5–7.7)
NEUTROPHILS # BLD AUTO: 4.58 X10(3) UL (ref 1.5–7.7)
NEUTROPHILS NFR BLD AUTO: 56.1 %
OSMOLALITY SERPL CALC.SUM OF ELEC: 297 MOSM/KG (ref 275–295)
PLATELET # BLD AUTO: 144 10(3)UL (ref 150–450)
POTASSIUM SERPL-SCNC: 4.1 MMOL/L (ref 3.5–5.1)
RBC # BLD AUTO: 4.22 X10(6)UL
SODIUM SERPL-SCNC: 143 MMOL/L (ref 136–145)
WBC # BLD AUTO: 8.2 X10(3) UL (ref 4–11)

## 2025-02-25 PROCEDURE — 80048 BASIC METABOLIC PNL TOTAL CA: CPT

## 2025-02-25 PROCEDURE — 36415 COLL VENOUS BLD VENIPUNCTURE: CPT

## 2025-02-25 PROCEDURE — 85025 COMPLETE CBC W/AUTO DIFF WBC: CPT

## 2025-03-04 RX ORDER — AMLODIPINE AND BENAZEPRIL HYDROCHLORIDE 10; 20 MG/1; MG/1
1 CAPSULE ORAL DAILY
Status: ON HOLD | COMMUNITY
Start: 2024-09-12 | End: 2025-03-05

## 2025-03-05 ENCOUNTER — HOSPITAL ENCOUNTER (OUTPATIENT)
Dept: INTERVENTIONAL RADIOLOGY/VASCULAR | Facility: HOSPITAL | Age: 75
Discharge: HOME | End: 2025-03-05
Attending: UROLOGY | Admitting: RADIOLOGY
Payer: MEDICARE

## 2025-03-05 VITALS
RESPIRATION RATE: 18 BRPM | DIASTOLIC BLOOD PRESSURE: 47 MMHG | OXYGEN SATURATION: 96 % | TEMPERATURE: 97 F | HEART RATE: 73 BPM | BODY MASS INDEX: 41.99 KG/M2 | HEIGHT: 63 IN | WEIGHT: 237 LBS | SYSTOLIC BLOOD PRESSURE: 139 MMHG

## 2025-03-05 DIAGNOSIS — N28.1 CYST OF RIGHT KIDNEY: ICD-10-CM

## 2025-03-05 DIAGNOSIS — Z01.818 PRE-OP TESTING: Primary | ICD-10-CM

## 2025-03-05 PROCEDURE — 0H97XZZ DRAINAGE OF ABDOMEN SKIN, EXTERNAL APPROACH: ICD-10-PCS | Performed by: RADIOLOGY

## 2025-03-05 PROCEDURE — 49185 SCLEROTX FLUID COLLECTION: CPT | Performed by: RADIOLOGY

## 2025-03-05 PROCEDURE — 99153 MOD SED SAME PHYS/QHP EA: CPT | Performed by: RADIOLOGY

## 2025-03-05 PROCEDURE — 36415 COLL VENOUS BLD VENIPUNCTURE: CPT

## 2025-03-05 PROCEDURE — 99152 MOD SED SAME PHYS/QHP 5/>YRS: CPT | Performed by: RADIOLOGY

## 2025-03-05 PROCEDURE — 88173 CYTOPATH EVAL FNA REPORT: CPT | Performed by: UROLOGY

## 2025-03-05 PROCEDURE — 10160 PNXR ASPIR ABSC HMTMA BULLA: CPT | Performed by: RADIOLOGY

## 2025-03-05 PROCEDURE — 77002 NEEDLE LOCALIZATION BY XRAY: CPT | Performed by: RADIOLOGY

## 2025-03-05 PROCEDURE — 0T903ZZ DRAINAGE OF RIGHT KIDNEY, PERCUTANEOUS APPROACH: ICD-10-PCS | Performed by: RADIOLOGY

## 2025-03-05 RX ORDER — METHOCARBAMOL 750 MG/1
750 TABLET, FILM COATED ORAL EVERY EVENING
COMMUNITY
Start: 2025-03-03

## 2025-03-05 RX ORDER — FUROSEMIDE 20 MG/1
20 TABLET ORAL DAILY
COMMUNITY

## 2025-03-05 RX ORDER — LIDOCAINE HYDROCHLORIDE 20 MG/ML
INJECTION, SOLUTION INFILTRATION; PERINEURAL
Status: COMPLETED
Start: 2025-03-05 | End: 2025-03-05

## 2025-03-05 RX ORDER — AZELASTINE HYDROCHLORIDE, FLUTICASONE PROPIONATE 137; 50 UG/1; UG/1
1 SPRAY, METERED NASAL DAILY
COMMUNITY
Start: 2024-03-20

## 2025-03-05 RX ORDER — METOCLOPRAMIDE 10 MG/1
10 TABLET ORAL NIGHTLY
COMMUNITY

## 2025-03-05 RX ORDER — ALCOHOL 0.98 ML/ML
20 INJECTION INTRASPINAL ONCE
Status: DISCONTINUED | OUTPATIENT
Start: 2025-03-05 | End: 2025-03-05

## 2025-03-05 RX ORDER — SACUBITRIL AND VALSARTAN 24; 26 MG/1; MG/1
1 TABLET, FILM COATED ORAL DAILY
COMMUNITY

## 2025-03-05 RX ORDER — UMECLIDINIUM 62.5 UG/1
1 AEROSOL, POWDER ORAL DAILY PRN
COMMUNITY
Start: 2024-03-20

## 2025-03-05 RX ORDER — IOPAMIDOL 612 MG/ML
30 INJECTION, SOLUTION INTRAVASCULAR
Status: COMPLETED | OUTPATIENT
Start: 2025-03-05 | End: 2025-03-05

## 2025-03-05 RX ORDER — LINACLOTIDE 290 UG/1
290 CAPSULE, GELATIN COATED ORAL DAILY PRN
COMMUNITY
Start: 2024-12-26

## 2025-03-05 RX ORDER — ROSUVASTATIN CALCIUM 40 MG/1
40 TABLET, COATED ORAL NIGHTLY
COMMUNITY
Start: 2024-12-23

## 2025-03-05 RX ORDER — SODIUM CHLORIDE 9 MG/ML
INJECTION, SOLUTION INTRAVENOUS CONTINUOUS
Status: DISCONTINUED | OUTPATIENT
Start: 2025-03-05 | End: 2025-03-05

## 2025-03-05 RX ORDER — IBUPROFEN 600 MG/1
600 TABLET, FILM COATED ORAL EVERY 8 HOURS PRN
COMMUNITY

## 2025-03-05 RX ORDER — MIDAZOLAM HYDROCHLORIDE 1 MG/ML
INJECTION INTRAMUSCULAR; INTRAVENOUS
Status: COMPLETED
Start: 2025-03-05 | End: 2025-03-05

## 2025-03-05 RX ADMIN — SODIUM CHLORIDE: 9 INJECTION, SOLUTION INTRAVENOUS at 10:45:00

## 2025-03-05 RX ADMIN — IOPAMIDOL 20 ML: 612 INJECTION, SOLUTION INTRAVASCULAR at 11:31:00

## 2025-03-05 NOTE — PROCEDURES
[facility]  part of [facility]  Procedure Note    Carly Contreras Patient Status:  Outpatient    1950 MRN [id number]   Location [facility] INTERVENTIONAL SUITES Attending Hosea Farley MD   Hosp Day # 0 PCP No primary care provider on file.     Procedure: right renal cyst aspiration/sclerosis    Pre-Procedure Diagnosis:  symptomatic right renal cyst    Post-Procedure Diagnosis: same    Anesthesia:  Local and Sedation    Findings:  240 mls of clear fluid removed/ sclerosis with etoh    Specimens: cyst fluid sent for cytology    Blood Loss:  minimal       Complications:  None       Plan:    F/u prn    Damaso Meza MD  3/5/2025

## 2025-03-05 NOTE — PRE-SEDATION ASSESSMENT
[facility]  part of [facility] Pre-Procedure Sedation Assessment    History of snoring or sleep or apnea?   No    History of previous problems with anesthesia or sedation  No    Physical Findings:  Neck: nl ROM  CV: rrr  PULM: normal respiratory rate/effort    Mallampati Score:  II (hard and soft palate, upper portion of tonsils anduvula visible)    ASA Classification:   2. Patient with mild systemic disease    Plan:   -IV moderate sedation    Damaso Meza MD

## 2025-03-05 NOTE — DISCHARGE INSTRUCTIONS
INTERVENTIONAL RADIOLOGY  [facility]  [phone]     Patient Name:  Carly Contreras    Procedure:  Renal Cyst Aspiration and Sclerotherapy    Site Care: Remove your band-aid or dressing in 24 hours.  Gently wash area with soap and water.                                       Activity/Diet  No heavy lifting or strenuous activity for 48 hours.  Drink plenty of fluids, unless you have otherwise been told to restrict your fluid intake.  Do not drink alcohol for 24 hours.  Do not drive,  operate heavy machinery, make important decisions or sign legal documents today.    Medications:  Make no changes to your existing medications.    Contact Interventional Radiology at [phone] if you have severe/unrelieved pain, fever, chills, dizziness/lightheadedness, or drainage/bleeding from your incision site.

## 2025-03-05 NOTE — H&P
[facility]  part of [facility]   History & Physical    Carly Contreras Patient Status:  Outpatient    1950 MRN [id number]   Location [facility] INTERVENTIONAL SUITES Attending Hosea Farley MD   Hosp Day # 0 PCP No primary care provider on file.     Admitting Diagnosis:   Symptomatic right renal cyst    History of Present Illness:   Large right renal cyst--- causing   back pain    History   Past Medical History:  Past Medical History:    Arthritis    Asthma (HCC)    Back pain    Esophageal reflux    Essential hypertension    FADI (obstructive sleep apnea)       Past Surgical History:  Past Surgical History:   Procedure Laterality Date          Excis lumbar disk,one level      Knee replacement surgery         Social History:  Social History     Tobacco Use    Smoking status: Never     Passive exposure: Never    Smokeless tobacco: Never   Substance Use Topics    Alcohol use: Never        Family History:  Family History   Problem Relation Age of Onset    Hypertension Sister     Heart Disease Brother     Cancer Maternal Grandfather        Allergies/Medications:   Allergies:  Allergies[1]    Medications:  No current outpatient medications on file.    Physical Exam & Review of Systems:   Physical Exam:    Temp 96.8 °F (36 °C) (Tympanic)   Ht 63\"   Wt 237 lb (107.5 kg)   BMI 41.98 kg/m²     General: NAD  Neck: No JVD  Lungs: CTA bilat  Heart: RRR, S1, S2  Abdomen: Soft, NT/ND, BS+x4       Results:   Labs:  No results for input(s): \"RBC\", \"HGB\", \"HCT\", \"MCV\", \"MCH\", \"MCHC\", \"RDW\", \"NEPRELIM\", \"WBC\", \"PLT\" in the last 168 hours.  No results for input(s): \"PTP\", \"INR\", \"PTT\" in the last 168 hours.  No results for input(s): \"GLU\", \"BUN\", \"CREATSERUM\", \"GFRAA\", \"GFRNAA\", \"CA\", \"NA\", \"K\", \"CL\", \"CO2\" in the last 168 hours.    Assessment/Plan:   Impression: symptomatic right renal cyst    Recommendations: 1. Aspiration of right renal cyst with possible concomitant  sclerotherapy    Damaso Meza MD  3/5/2025  10:47 AM           [1]   Allergies  Allergen Reactions    Tetanus Immune Globulin RASH    Tetanus Toxoids UNKNOWN, OTHER (SEE COMMENTS) and RASH     Pt was told she was allergic - ? reaction

## 2025-03-05 NOTE — IVS NOTE
DISCHARGE NOTE     Pt is able to sit up and ambulate without difficulty.   Pt voided and tolerated fluids and food.   Procedural site remains dry and intact   No signs and symptoms of bleeding/hematoma noted.   IV access removed  Instruction provided, patient/family verbalizes understanding.   Dr. Meza spoke with patient/family pre procedure.     Pt discharge via wheelchair to [facility]     Follow up Appointment: PRN    New Prescription: n/a

## 2025-05-09 NOTE — PROGRESS NOTES
CLIF DOMÍNGUEZ  2025 - 2025  Patient ID: [id number]  : 1950  Age: 74 years  Gender: Female  [place]  [address]  [facility] [address]  Phone: [phone]  Fax: [phone]  Email: [email or web address]  Compliance Report  Compliance  Payor Standard  Usage 2025 - 2025  Usage days 28/30 days (93%)  >= 4 hours 27 days (90%)  < 4 hours 1 days (3%)  Usage hours 195 hours 10 minutes  Average usage (total days) 6 hours 30 minutes  Average usage (days used) 6 hours 58 minutes  Median usage (days used) 7 hours 2 minutes  Total used hours (value since last reset - 2025) 1,882 hours  AirSense 11 AutoSet  Serial number [id number]  Mode CPAP  Set pressure 13 cmH2O  EPR Fulltime  EPR level 3  Therapy  Leaks - L/min Median: 3.9 95th percentile: 30.5 Maximum: 47.0  Events per hour AI: 0.9 HI: 0.2 AHI: 1.1  Apnea Index Central: 0.4 Obstructive: 0.3 Unknown: 0.1  RERA Index 0.3  Cheyne-Caban respiration (average duration per night) 0 minutes (0%)

## 2025-05-12 ENCOUNTER — OFFICE VISIT (OUTPATIENT)
Facility: CLINIC | Age: 75
End: 2025-05-12
Payer: COMMERCIAL

## 2025-05-12 VITALS
RESPIRATION RATE: 14 BRPM | SYSTOLIC BLOOD PRESSURE: 126 MMHG | WEIGHT: 232.38 LBS | HEART RATE: 64 BPM | OXYGEN SATURATION: 99 % | HEIGHT: 63 IN | TEMPERATURE: 98 F | BODY MASS INDEX: 41.18 KG/M2 | DIASTOLIC BLOOD PRESSURE: 78 MMHG

## 2025-05-12 DIAGNOSIS — G47.19 DAYTIME HYPERSOMNOLENCE: Primary | ICD-10-CM

## 2025-05-12 DIAGNOSIS — E66.01 SEVERE OBESITY (BMI >= 40) (HCC): ICD-10-CM

## 2025-05-12 DIAGNOSIS — G47.33 OSA (OBSTRUCTIVE SLEEP APNEA): ICD-10-CM

## 2025-05-12 PROCEDURE — 3078F DIAST BP <80 MM HG: CPT | Performed by: INTERNAL MEDICINE

## 2025-05-12 PROCEDURE — 3074F SYST BP LT 130 MM HG: CPT | Performed by: INTERNAL MEDICINE

## 2025-05-12 PROCEDURE — 1159F MED LIST DOCD IN RCRD: CPT | Performed by: INTERNAL MEDICINE

## 2025-05-12 PROCEDURE — 99215 OFFICE O/P EST HI 40 MIN: CPT | Performed by: INTERNAL MEDICINE

## 2025-05-12 PROCEDURE — 1160F RVW MEDS BY RX/DR IN RCRD: CPT | Performed by: INTERNAL MEDICINE

## 2025-05-12 PROCEDURE — 3008F BODY MASS INDEX DOCD: CPT | Performed by: INTERNAL MEDICINE

## 2025-05-12 RX ORDER — BUDESONIDE AND FORMOTEROL FUMARATE DIHYDRATE 160; 4.5 UG/1; UG/1
2 AEROSOL RESPIRATORY (INHALATION) 2 TIMES DAILY
Qty: 1 EACH | Refills: 5 | Status: SHIPPED | OUTPATIENT
Start: 2025-05-12

## 2025-05-12 RX ORDER — ALBUTEROL SULFATE 90 UG/1
2 INHALANT RESPIRATORY (INHALATION) EVERY 6 HOURS PRN
Qty: 1 EACH | Refills: 3 | Status: SHIPPED | OUTPATIENT
Start: 2025-05-12

## 2025-05-12 NOTE — PATIENT INSTRUCTIONS
Plan:      Schedule CPAP titration sleep study to determine appropriate mask and appropriate pressures   Would need Climatic Tubing to prevent loss of humidifier water   Follow up  weight loss program at [place]   Continue Symbicort 160-4.5 mcg 2 puffs twice daily  Albuterol HFA MDI 2 puffs 4 times daily as needed   Azelastine 137 mcg 1-2 puffs each nostril as needed for congested or runny nose     Follow up: 4 months with PA and  8 months with Dr. Michelet Tafoya MD      Obstructive Sleep Apnea  Obstructive sleep apnea is a condition caused by air passages becoming narrowed or blocked during sleep. As a result, breathing stops for short periods. Your body wakes up enough for breathing to start again. But you don't remember it. The cycle of stopped breathing and brief awakenings can repeat dozens of times a night. This prevents the body from getting to the deeper stages of sleep that are needed for good rest.   Signs of sleep apnea include loud snoring, noisy breathing, and gasping sounds during sleep. People with sleep apnea often find they use the bathroom many times during the night. Daytime symptoms include waking up tired after a full night's sleep and waking up with headaches. They can also include feeling very sleepy or falling asleep during the day, and having problems with memory or concentration.   Risk factors for sleep apnea include:  Being overweight  Being assigned male at birth, or being in menopause  Smoking  Using alcohol or sedating medicines  Having enlarged structures in the nose or throat such as enlarged tonsils or adenoids, or extra tissue in the airway  Home care  Lifestyle changes that can help treat snoring and sleep apnea include:   If you're overweight, talk with your healthcare provider about a weight-loss plan for you.  Don't drink alcohol for 3 to 4 hours before bedtime.  Don't take sedating medicines. Ask your healthcare provider about the medicines you take.  If you  smoke, talk to your provider about ways to quit. It's important to stay away from secondhand smoke. Don't use e-cigarettes because of their harmful side effects.  Sleep on your side. This can help prevent gravity from pulling relaxed throat tissues into your breathing passages.  If you have allergies or sinus problems that block your nose, ask your provider for help.  Use positive airway pressure (PAP). Discuss with your provider the benefits of using PAP at home. And talk about the type of PAP that's best for you.  Follow-up care  Follow up with your healthcare provider, or as advised. A diagnosis of sleep apnea is made with a sleep study. Your provider can tell you more about this test.   When to get medical care  See your healthcare provider if you have daytime symptoms of sleep apnea. These include:   Waking up tired after a full night's sleep  Waking up with a headache  Feeling very sleepy or falling asleep during the day  Having problems with memory or concentration  Also talk with your provider if your partner tells you that you snore, gasp for air, or stop breathing while you sleep.   Seeing your provider is important because sleep apnea can make you more likely to have certain health problems. These include high blood pressure, heart attack, stroke, and sexual dysfunction. If you have sleep apnea, talk with your healthcare provider about the best treatments for you.   [employer] last reviewed this educational content on 5/1/2022 © 2000-2023 [employer]. All rights reserved. This information is not intended as a substitute for professional medical care. Always follow your healthcare professional's instructions.        Continuous Positive Air Pressure (CPAP)     A mask over the nose gently directs air into the throat to keep the airway open.     Continuous positive air pressure (CPAP) uses gentle air pressure to hold the airway open. CPAP is often the most effective treatment for sleep apnea. It  works very well as a treatment for adults diagnosed with obstructive sleep apnea with a lot of sleepiness. But keep in mind that it can take several adjustments before the setup is right for you.   How CPAP works  The CPAP machine  is a small portable pump that sits beside the bed. The pump sends air through a hose, which is held over your nose alone, or nose and mouth by a mask. Mild air pressure is gently pushed through your airway. The air pressure nudges sagging tissues aside. This widens the airway so you can breathe better. CPAP may be combined with other kinds of therapy for sleep apnea.   Types of air pressure treatments  There are different types of CPAP. Your doctor or CPAP technician will help you decide which type is best for you:   Basic CPAP keeps the pressure constant all night long.  A bilevel device (BiPAP) provides more pressure when you breathe in and less when you breathe out. A BiPAP machine also may be set to provide automatic breaths to maintain breathing if you stop breathing while sleeping.  An autoCPAP device automatically adjusts pressure throughout the night and in response to changes such as body position, sleep stage, and snoring.  [employer] last reviewed this educational content on 7/1/2019  © 7323-5689 [employer]. All rights reserved. This information is not intended as a substitute for professional medical care. Always follow your healthcare professional's instructions.

## 2025-05-12 NOTE — PROGRESS NOTES
Pulmonary/Critical Care/Sleep Medicine   Progress  Note     PCP: No primary care provider on file.   Phone: [phone]   Fax: [phone]        Chief Complaint   Patient presents with    Obstructive Sleep Apnea (FADI)     Dme:  cedric   Mask:        HPI  I had the pleasure of seeing Carly Contreras who is a pleasant 74 year old female who presents for follow up  of FADI and asthma 2024          The patient reports using CPAP daily at night at 13 cmH2O regularly throughout the night for about 6 hours and states that the  PAP machine is quiet and has a heated humidifier.  The patient admits to daytime hypersomnolence or fatigue. Yeni feels that the pressure is too much on CPAP. Her CPAP machine  runs out of water fast          The patient does  kick her legs at night. Denies teeth grinding.      She drinks 2  caffeine Coke zero cans of soda daily.       She has no pets           Hx of tobacco use: She  reports that she has never smoked. She has never been exposed to tobacco smoke. She has never used smokeless tobacco.    Past Medical History:    Arthritis    Asthma (HCC)    Back pain    Esophageal reflux    Essential hypertension    FADI (obstructive sleep apnea)      Past Surgical History:   Procedure Laterality Date          Excis lumbar disk,one level      Knee replacement surgery       Allergies   Allergen Reactions    Tetanus Immune Globulin RASH    Tetanus Toxoids UNKNOWN, OTHER (SEE COMMENTS) and RASH     Pt was told she was allergic - ? reaction     Current Outpatient Medications   Medication Sig Dispense Refill    Acetaminophen-Codeine #3 (TYLENOL WITH CODEINE #3) 300-30 MG Oral Tab Tylenol-Codeine #3 300 mg-30 mg tablet   Take 1 tablet every 8 hours by oral route as needed for 10 days.      amoxicillin 500 MG Oral Cap       celecoxib (CELEBREX) 200 MG Oral Cap       citalopram 20 MG Oral Tab citalopram 20 mg tablet   Take 1 tablet every day by oral route as directed for 90 days.       cloNIDine 0.1 MG Oral Tab       clotrimazole-betamethasone (LOTRISONE) 1-0.05 % External Cream Lotrisone 1 %-0.05 % topical cream   APPLY TO THE AFFECTED AND SURROUNDING AREAS OF SKIN BY TOPICAL ROUTE 2 TIMES PER DAY IN THE MORNING AND EVENING FOR 2 WEEKS      cyclobenzaprine 10 MG Oral Tab       Diclofenac Sodium  MG Oral Tablet 24 Hr Take 1 tablet every day by oral route as needed for 90 days.      enalapril 10 MG Oral Tab       enalapril 20 MG Oral Tab       fluocinonide 0.05 % External Ointment       folic acid 1 MG Oral Tab Take 1 tablet (1 mg total) by mouth daily.      ipratropium 0.02 % Inhalation Solution 2.5 mL (0.5 mg total) every 6 (six) hours.      loratadine 10 MG Oral Tab       meclizine 25 MG Oral Tab Antivert 25 mg tablet   Take 1 tablet 3 times a day by oral route for 10 days.      metoprolol tartrate 50 MG Oral Tab       Metoprolol-hydroCHLOROthiazide 100-25 MG Oral Tab       nebivolol (BYSTOLIC) 10 MG Oral Tab       nebivolol (BYSTOLIC) 5 MG Oral Tab Take 1 tablet every day by oral route for 1 day.      nitrofurantoin monohydrate macro 100 MG Oral Cap Take 1 capsule (100 mg total) by mouth in the morning and 1 capsule (100 mg total) in the evening.      oxyCODONE ER 10 MG Oral Tablet Extended Release 12 hour Abuse-Deterrent       PEG 3350-KCl-Na Bicarb-NaCl 420 g Oral Recon Soln       polyethylene glycol, PEG 3350, (MIRALAX) 17 GM/SCOOP Oral Powder Miralax 17 gram oral powder packet   Take 1 packet every day by oral route as directed for 30 days.      Promethazine-Phenyleph-Codeine 6.25-5-10 MG/5ML Oral Syrup 5 mL every 4 (four) hours.      rivaroxaban (XARELTO) 10 MG Oral Tab       tiZANidine 2 MG Oral Tab Take 1 tablet (2 mg total) by mouth 3 (three) times daily.      sertraline 25 MG Oral Tab Take 1 tablet (25 mg total) by mouth every morning.      triamcinolone acetonide (KENALOG) 10 MG/ML Injection Suspension Kenalog 10 mg/mL suspension for injection      triamcinolone acetonide  (KENALOG) 40 MG/ML Injection Suspension Kenalog 40 mg/mL suspension for injection      ENTRESTO 24-26 MG Oral Tab Take 1 tablet by mouth daily.      rosuvastatin 40 MG Oral Tab Take 1 tablet (40 mg total) by mouth nightly. TAKE AT BEDTIME      amitriptyline 25 MG Oral Tab Take 0.5 tablets (12.5 mg total) by mouth nightly as needed (sleep).      Azelastine-Fluticasone 137-50 MCG/ACT Nasal Suspension Inhale 1 spray into the lungs daily.      furosemide 20 MG Oral Tab Take 1 tablet (20 mg total) by mouth daily.      ibuprofen 600 MG Oral Tab Take 1 tablet (600 mg total) by mouth every 8 (eight) hours as needed for Pain.      LINZESS 290 MCG Oral Cap Take 1 capsule (290 mcg total) by mouth daily as needed.      INCRUSE ELLIPTA 62.5 MCG/ACT Inhalation Aerosol Powder, Breath Activated Inhale 1 puff into the lungs daily as needed.      methocarbamol 750 MG Oral Tab Take 1 tablet (750 mg total) by mouth every evening.      metoclopramide 10 MG Oral Tab Take 1 tablet (10 mg total) by mouth at bedtime.      semaglutide-weight management 0.25 MG/0.5ML Subcutaneous Solution Auto-injector Inject 0.5 mL (0.25 mg total) into the skin once a week. 2 mL 1    VENTOLIN  (90 Base) MCG/ACT Inhalation Aero Soln Inhale 2 puffs into the lungs every 4 to 6 hours as needed.      Dexlansoprazole (DEXILANT) 60 MG Oral Capsule Delayed Release Take 60 mg by mouth daily.        Social History     Socioeconomic History    Marital status: Single   Occupational History    Occupation: [employer]   Tobacco Use    Smoking status: Never     Passive exposure: Never    Smokeless tobacco: Never   Vaping Use    Vaping status: Never Used   Substance and Sexual Activity    Alcohol use: Never    Drug use: Never     Social Drivers of Health      Received from [facility]    Housing Stability        There is no immunization history on file for this patient.   Family History   Problem Relation Age of Onset    Hypertension Sister      Heart Disease Brother     Cancer Maternal Grandfather         Review of Systems   Constitutional:  Positive for fatigue. Negative for fever and unexpected weight change.   HENT:  Negative for congestion, mouth sores, nosebleeds, postnasal drip, rhinorrhea, sore throat and trouble swallowing.    Eyes:  Negative for visual disturbance.   Respiratory:  Positive for shortness of breath and wheezing. Negative for apnea, cough, choking and chest tightness.    Cardiovascular:  Negative for chest pain, palpitations and leg swelling.   Gastrointestinal:  Negative for abdominal pain, constipation, diarrhea, nausea and vomiting.   Genitourinary:  Negative for difficulty urinating.   Musculoskeletal:  Positive for arthralgias, back pain and myalgias. Negative for gait problem.   Neurological:  Negative for dizziness, weakness and headaches.   Psychiatric/Behavioral:  Positive for sleep disturbance.        Vitals:    05/12/25 1447   BP: 126/78   Pulse: 64   Resp: 14   Temp: 98.2 °F (36.8 °C)      SpO2: 99 %  Ht Readings from Last 1 Encounters:   05/12/25 5' 3\" (1.6 m)     Wt Readings from Last 1 Encounters:   05/12/25 232 lb 6 oz (105.4 kg)     Body mass index is 41.16 kg/m².     Physical Exam  Constitutional:       General: She is not in acute distress.     Appearance: Normal appearance. She is obese. She is not ill-appearing or diaphoretic.   HENT:      Head: Normocephalic and atraumatic.      Nose: Nose normal. No congestion or rhinorrhea.      Comments: Narrow edematous nares bilaterally      Mouth/Throat:      Mouth: Mucous membranes are moist.      Pharynx: Oropharynx is clear. No oropharyngeal exudate or posterior oropharyngeal erythema.      Comments: Mallampati class III palate   Eyes:      Extraocular Movements: Extraocular movements intact.      Pupils: Pupils are equal, round, and reactive to light.   Cardiovascular:      Rate and Rhythm: Normal rate.      Pulses: Normal pulses.      Heart sounds: Normal heart  sounds. No murmur heard.  Pulmonary:      Effort: Pulmonary effort is normal. No respiratory distress.      Breath sounds: Normal breath sounds. No wheezing or rhonchi.   Chest:      Chest wall: No tenderness.   Abdominal:      General: Abdomen is flat. Bowel sounds are normal.      Palpations: Abdomen is soft.   Musculoskeletal:         General: Normal range of motion.      Comments: Arthritis changes in both hands    Skin:     General: Skin is warm.      Comments: Vitiligo bilateral hand dorsum    Neurological:      General: No focal deficit present.      Mental Status: She is alert and oriented to person, place, and time.   Psychiatric:         Mood and Affect: Mood normal.         Behavior: Behavior normal.         Thought Content: Thought content normal.         Judgment: Judgment normal.             Labs:  Last BMP  Lab Results   Component Value Date    GLU 83 02/25/2025    BUN 17 02/25/2025    CREATSERUM 1.17 (H) 02/25/2025    BUNCREA 14.5 02/25/2025    ANIONGAP 11 02/25/2025    GFRAA 64 02/29/2020    GFRNAA 56 (L) 02/29/2020    CA 9.2 02/25/2025     02/25/2025    K 4.1 02/25/2025     02/25/2025    CO2 25.0 02/25/2025    OSMOCALC 297 (H) 02/25/2025      Last CBC  Lab Results   Component Value Date    WBC 8.2 02/25/2025    RBC 4.22 02/25/2025    HGB 12.7 02/25/2025    HCT 38.1 02/25/2025    MCV 90.3 02/25/2025    MCH 30.1 02/25/2025    MCHC 33.3 02/25/2025    RDW 12.9 02/25/2025    .0 (L) 02/25/2025      Last CMP  Lab Results   Component Value Date    GLU 83 02/25/2025    BUN 17 02/25/2025    BUNCREA 14.5 02/25/2025    CREATSERUM 1.17 (H) 02/25/2025    ANIONGAP 11 02/25/2025    GFRNAA 56 (L) 02/29/2020    GFRAA 64 02/29/2020    CA 9.2 02/25/2025    OSMOCALC 297 (H) 02/25/2025    ALKPHO 86 04/03/2024    AST 19 04/03/2024    ALT 14 04/03/2024    BILT 0.6 04/03/2024    TP 7.4 04/03/2024    ALB 4.5 04/03/2024    GLOBULIN 2.9 04/03/2024     02/25/2025    K 4.1 02/25/2025      02/25/2025    CO2 25.0 02/25/2025      Last Thyroid Function  Lab Results   Component Value Date    TSH 1.086 04/03/2024        Imaging:     PROCEDURE:  CT ANGIOGRAPHY, CHEST (CPT=71275)     COMPARISON:  None.     INDICATIONS:  chest pain     TECHNIQUE:  IV contrast-enhanced multislice CT angiography is performed through the pulmonary arterial anatomy. 3D volume renderings are generated.  Dose reduction techniques were used. Dose information is transmitted to the ACR (American College of  Radiology) NRDR (National Radiology Data Registry) which includes the Dose Index Registry. The patient is from the Emergency Department. This examination performed and interpreted on emergency basis, as per STAT status, for emergency room protocol.        PATIENT STATED HISTORY:(As transcribed by Technologist)  Patient with intermittent chest pain and shortness of breath      CONTRAST USED:  85cc of Omnipaque 350     FINDINGS:       VASCULATURE:  Negative for acute pulmonary embolism.  No filling defects are seen centrally or peripherally within the pulmonary arterial system.     LUNGS/PLEURA:  No acute process.     THORACIC AORTA:  No thoracic aortic aneurysm.     MEDIASTINUM/NOLAN:  No pathologically enlarged nodes.     CARDIAC:  Unremarkable.     CHEST WALL:  Unremarkable.     LIMITED ABDOMEN:  No acute process seen.     BONES:  No acute process seen.     OTHER:  None.         Impression   CONCLUSION:  Negative for acute pulmonary embolism, pneumothorax, pleural effusion, acute pneumonia, or lung mass.        Dictated by: João Aguayo MD on 2/29/2020 at 20:31                  [place] Sleepiness Scale: (ESS) score on today's visit is   out of 24.     Score total of 1-6    Normal sleep   Score total of 7-8    Average sleepiness   Score total of 9-24    Abnormal (possibly pathologic) sleepiness       Impression:      Obstructive sleep apnea syndrome (OSAS): Attended sleep study performed on 4/21/2021 showed apnea plus hypopnea  index (AHI) 7.4  events per hour.  Treated with NEW CPAP 13 cwp.  But with c/o high pressures and dry mouth with loss of humidifier water due to poor mask fit Download data on 5/8/2025  for  30 days shows adequate AHI and compliance   Daytime hypersomnolence/fatigue  Obesity: Class III  ;  Body mass index is 41.16 kg/m².  Asthma mild persistent with intermittent symptoms   GERD   Hypertension   Insomnia                               Plan:      Schedule CPAP titration sleep study to determine appropriate mask and appropriate pressures   Would need Climatic Tubing to prevent loss of humidifier water   Follow up  weight loss program at [place]   Continue Symbicort 160-4.5 mcg 2 puffs twice daily  Albuterol HFA MDI 2 puffs 4 times daily as needed   Azelastine 137 mcg 1-2 puffs each nostril as needed for congested or runny nose     Follow up: 4 months with PA and  8 months with Dr. Tafoya       Thank you for allowing me to participate in your patient care.    MALU Tafoya MD, FACP, FCCP, FAASM - Pulmonary/Critical care/Sleep Medicine  Please contact our office if you have any questions or concerns at [phone]

## 2025-06-04 ENCOUNTER — ORDER TRANSCRIPTION (OUTPATIENT)
Dept: SLEEP CENTER | Age: 75
End: 2025-06-04

## 2025-08-16 ENCOUNTER — OFFICE VISIT (OUTPATIENT)
Dept: SLEEP CENTER | Age: 75
End: 2025-08-16
Attending: INTERNAL MEDICINE

## 2025-08-16 DIAGNOSIS — G47.19 DAYTIME HYPERSOMNOLENCE: ICD-10-CM

## 2025-08-16 DIAGNOSIS — G47.33 OSA (OBSTRUCTIVE SLEEP APNEA): ICD-10-CM

## 2025-08-16 PROCEDURE — 95811 POLYSOM 6/>YRS CPAP 4/> PARM: CPT

## 2025-08-19 ENCOUNTER — SLEEP STUDY (OUTPATIENT)
Facility: CLINIC | Age: 75
End: 2025-08-19

## 2025-08-19 DIAGNOSIS — G47.33 OBSTRUCTIVE SLEEP APNEA: Primary | ICD-10-CM

## 2025-08-19 PROCEDURE — 95811 POLYSOM 6/>YRS CPAP 4/> PARM: CPT | Performed by: INTERNAL MEDICINE

## (undated) DEVICE — Device

## (undated) DEVICE — LAWSON - WATER STL IRR PIC 1000ML

## (undated) NOTE — ED AVS SNAPSHOT
Tono Luis   MRN: [id number]    Department:  [facility] Emergency Department   Date of Visit:  2/29/2020           Disclosure     Insurance plans vary and the physician(s) referred by the ER may not be covered by your plan.  Please contact your i tell this physician (or your personal doctor if your instructions are to return to your personal doctor) about any new or lasting problems. The primary care or specialist physician will see patients referred from the [facility] Emergency Department.  Wing Phillips